# Patient Record
Sex: MALE | Race: WHITE | NOT HISPANIC OR LATINO
[De-identification: names, ages, dates, MRNs, and addresses within clinical notes are randomized per-mention and may not be internally consistent; named-entity substitution may affect disease eponyms.]

---

## 2017-01-04 ENCOUNTER — MEDICATION RENEWAL (OUTPATIENT)
Age: 47
End: 2017-01-04

## 2017-04-24 ENCOUNTER — APPOINTMENT (OUTPATIENT)
Dept: INTERNAL MEDICINE | Facility: CLINIC | Age: 47
End: 2017-04-24

## 2017-04-24 VITALS — OXYGEN SATURATION: 95 % | HEART RATE: 72 BPM | DIASTOLIC BLOOD PRESSURE: 80 MMHG | SYSTOLIC BLOOD PRESSURE: 125 MMHG

## 2017-05-10 ENCOUNTER — APPOINTMENT (OUTPATIENT)
Dept: INTERNAL MEDICINE | Facility: CLINIC | Age: 47
End: 2017-05-10

## 2017-05-10 VITALS — HEART RATE: 91 BPM | OXYGEN SATURATION: 97 % | DIASTOLIC BLOOD PRESSURE: 75 MMHG | SYSTOLIC BLOOD PRESSURE: 130 MMHG

## 2017-05-18 ENCOUNTER — APPOINTMENT (OUTPATIENT)
Dept: ORTHOPEDIC SURGERY | Facility: CLINIC | Age: 47
End: 2017-05-18

## 2017-05-18 VITALS — DIASTOLIC BLOOD PRESSURE: 88 MMHG | SYSTOLIC BLOOD PRESSURE: 120 MMHG | HEART RATE: 89 BPM

## 2017-05-18 VITALS — BODY MASS INDEX: 40.43 KG/M2 | WEIGHT: 315 LBS | HEIGHT: 74 IN

## 2017-05-21 ENCOUNTER — APPOINTMENT (OUTPATIENT)
Dept: MRI IMAGING | Facility: CLINIC | Age: 47
End: 2017-05-21

## 2017-05-21 ENCOUNTER — OUTPATIENT (OUTPATIENT)
Dept: OUTPATIENT SERVICES | Facility: HOSPITAL | Age: 47
LOS: 1 days | End: 2017-05-21
Payer: COMMERCIAL

## 2017-05-21 DIAGNOSIS — Z00.8 ENCOUNTER FOR OTHER GENERAL EXAMINATION: ICD-10-CM

## 2017-05-21 PROCEDURE — 73721 MRI JNT OF LWR EXTRE W/O DYE: CPT

## 2017-06-01 ENCOUNTER — APPOINTMENT (OUTPATIENT)
Dept: ORTHOPEDIC SURGERY | Facility: CLINIC | Age: 47
End: 2017-06-01

## 2017-06-14 ENCOUNTER — OUTPATIENT (OUTPATIENT)
Dept: OUTPATIENT SERVICES | Facility: HOSPITAL | Age: 47
LOS: 1 days | End: 2017-06-14
Payer: COMMERCIAL

## 2017-06-14 VITALS
HEART RATE: 80 BPM | SYSTOLIC BLOOD PRESSURE: 156 MMHG | DIASTOLIC BLOOD PRESSURE: 102 MMHG | RESPIRATION RATE: 16 BRPM | TEMPERATURE: 98 F | HEIGHT: 73.25 IN | WEIGHT: 315 LBS | OXYGEN SATURATION: 99 %

## 2017-06-14 DIAGNOSIS — R94.31 ABNORMAL ELECTROCARDIOGRAM [ECG] [EKG]: ICD-10-CM

## 2017-06-14 DIAGNOSIS — S83.209A UNSPECIFIED TEAR OF UNSPECIFIED MENISCUS, CURRENT INJURY, UNSPECIFIED KNEE, INITIAL ENCOUNTER: ICD-10-CM

## 2017-06-14 DIAGNOSIS — E66.9 OBESITY, UNSPECIFIED: ICD-10-CM

## 2017-06-14 DIAGNOSIS — I82.409 ACUTE EMBOLISM AND THROMBOSIS OF UNSPECIFIED DEEP VEINS OF UNSPECIFIED LOWER EXTREMITY: ICD-10-CM

## 2017-06-14 DIAGNOSIS — I10 ESSENTIAL (PRIMARY) HYPERTENSION: ICD-10-CM

## 2017-06-14 DIAGNOSIS — K08.499 PARTIAL LOSS OF TEETH DUE TO OTHER SPECIFIED CAUSE, UNSPECIFIED CLASS: Chronic | ICD-10-CM

## 2017-06-14 DIAGNOSIS — S83.242A OTHER TEAR OF MEDIAL MENISCUS, CURRENT INJURY, LEFT KNEE, INITIAL ENCOUNTER: ICD-10-CM

## 2017-06-14 LAB
BUN SERPL-MCNC: 12 MG/DL — SIGNIFICANT CHANGE UP (ref 7–23)
CALCIUM SERPL-MCNC: 9.5 MG/DL — SIGNIFICANT CHANGE UP (ref 8.4–10.5)
CHLORIDE SERPL-SCNC: 101 MMOL/L — SIGNIFICANT CHANGE UP (ref 98–107)
CO2 SERPL-SCNC: 21 MMOL/L — LOW (ref 22–31)
CREAT SERPL-MCNC: 1.05 MG/DL — SIGNIFICANT CHANGE UP (ref 0.5–1.3)
GLUCOSE SERPL-MCNC: 130 MG/DL — HIGH (ref 70–99)
HCT VFR BLD CALC: 49.9 % — SIGNIFICANT CHANGE UP (ref 39–50)
HGB BLD-MCNC: 17 G/DL — SIGNIFICANT CHANGE UP (ref 13–17)
MCHC RBC-ENTMCNC: 30.9 PG — SIGNIFICANT CHANGE UP (ref 27–34)
MCHC RBC-ENTMCNC: 34.1 % — SIGNIFICANT CHANGE UP (ref 32–36)
MCV RBC AUTO: 90.7 FL — SIGNIFICANT CHANGE UP (ref 80–100)
PLATELET # BLD AUTO: 208 K/UL — SIGNIFICANT CHANGE UP (ref 150–400)
PMV BLD: 11.2 FL — SIGNIFICANT CHANGE UP (ref 7–13)
POTASSIUM SERPL-MCNC: 4 MMOL/L — SIGNIFICANT CHANGE UP (ref 3.5–5.3)
POTASSIUM SERPL-SCNC: 4 MMOL/L — SIGNIFICANT CHANGE UP (ref 3.5–5.3)
RBC # BLD: 5.5 M/UL — SIGNIFICANT CHANGE UP (ref 4.2–5.8)
RBC # FLD: 12.6 % — SIGNIFICANT CHANGE UP (ref 10.3–14.5)
SODIUM SERPL-SCNC: 140 MMOL/L — SIGNIFICANT CHANGE UP (ref 135–145)
WBC # BLD: 10.53 K/UL — HIGH (ref 3.8–10.5)
WBC # FLD AUTO: 10.53 K/UL — HIGH (ref 3.8–10.5)

## 2017-06-14 PROCEDURE — 93010 ELECTROCARDIOGRAM REPORT: CPT

## 2017-06-14 NOTE — H&P PST ADULT - NEGATIVE GENERAL SYMPTOMS
no weight gain/no polyphagia/no polyuria/no chills/no fever/no polydipsia/no sweating/no weight loss

## 2017-06-14 NOTE — H&P PST ADULT - HISTORY OF PRESENT ILLNESS
45 y/o male with PMH of HTN , Bipolar Disorder, DVT of Left Leg in 2015 currently on Xarelto and Obesity presents to PST for preoperative evaluation with diagnosis of tear of medial meniscus, left knee. Pt reports during snow storm earlier this year, he twisted his knee when he slipped on ice. He states he continued to work on the knee hoping the pain would stop but it never subsided. Seen by an Orthopedic doctor and sent for MRI which noted tear to meniscus. Scheduled for Left Knee Partial Medical Menisectomy on 6/27/2017. c/o sharp pain of left knee when walking or climbing stairs. 47 y/o male with PMH of HTN , Bipolar Disorder, DVT/PE in 2015 currently on Xarelto and Obesity presents to PST for preoperative evaluation with diagnosis of tear of medial meniscus, left knee. Pt reports during snow storm earlier this year, he twisted his knee when he slipped on ice. He states he continued to work on the knee hoping the pain would stop but it never subsided. Seen by an Orthopedic doctor and sent for MRI which noted tear to meniscus. Scheduled for Left Knee Partial Medical Menisectomy on 6/27/2017. c/o sharp pain of left knee when walking or climbing stairs.

## 2017-06-14 NOTE — H&P PST ADULT - NEGATIVE CARDIOVASCULAR SYMPTOMS
no peripheral edema/no chest pain/no claudication/no palpitations/no paroxysmal nocturnal dyspnea/no dyspnea on exertion

## 2017-06-14 NOTE — H&P PST ADULT - RS GEN PE MLT RESP DETAILS PC
no chest wall tenderness/good air movement/breath sounds equal/no wheezes/respirations non-labored/airway patent

## 2017-06-14 NOTE — H&P PST ADULT - NEGATIVE ENMT SYMPTOMS
no ear pain/no tinnitus/no hearing difficulty/no dysphagia/no gum bleeding/no nose bleeds/no vertigo

## 2017-06-14 NOTE — H&P PST ADULT - NEGATIVE PSYCHIATRIC SYMPTOMS
no mood swings/no auditory hallucinations/no insomnia/no memory loss/no visual hallucinations/no paranoia/no anxiety

## 2017-06-14 NOTE — H&P PST ADULT - PROBLEM SELECTOR PLAN 3
Pt states he was told to hold Xarelto 5-7 days preop as per surgeon   Appt with PCP on 6/20/2017 for medical clearance  Medical clearance note requested

## 2017-06-14 NOTE — H&P PST ADULT - NEGATIVE NEUROLOGICAL SYMPTOMS
no paresthesias/no difficulty walking/no focal seizures/no facial palsy/no transient paralysis/no weakness/no loss of sensation/no confusion/no tremors/no hemiparesis/no syncope/no vertigo/no headache

## 2017-06-14 NOTE — H&P PST ADULT - PMH
Bipolar disorder    Current smoker    DVT (deep venous thrombosis)  left leg in 2015. currently on Xarelto  Hypertension    Meniscus tear  medial, left knee  Obesity  BMI 43  PE (pulmonary thromboembolism)  in 2015 currently on Xarelto

## 2017-06-14 NOTE — H&P PST ADULT - PSYCHIATRIC COMMENTS
h/o bipolar-controlled with meds. Seen by psych monthly h/o bipolar-controlled with meds. Seen by psych and receives Haldol injections monthly

## 2017-06-14 NOTE — H&P PST ADULT - NSANTHOSAYNRD_GEN_A_CORE
No. OFELIA screening performed.  STOP BANG Legend: 0-2 = LOW Risk; 3-4 = INTERMEDIATE Risk; 5-8 = HIGH Risk

## 2017-06-14 NOTE — H&P PST ADULT - PROBLEM SELECTOR PLAN 1
Scheduled for Left Knee Partial Medical Menisectomy on 6/27/2017.  Pre op instructions given, pt verbalized understanding   Chlorhexidine wash and GI prophylaxis provided

## 2017-06-19 ENCOUNTER — RESULT CHARGE (OUTPATIENT)
Age: 47
End: 2017-06-19

## 2017-06-20 ENCOUNTER — APPOINTMENT (OUTPATIENT)
Dept: INTERNAL MEDICINE | Facility: CLINIC | Age: 47
End: 2017-06-20

## 2017-06-20 ENCOUNTER — NON-APPOINTMENT (OUTPATIENT)
Age: 47
End: 2017-06-20

## 2017-06-20 VITALS
SYSTOLIC BLOOD PRESSURE: 130 MMHG | DIASTOLIC BLOOD PRESSURE: 84 MMHG | WEIGHT: 315 LBS | OXYGEN SATURATION: 97 % | BODY MASS INDEX: 41.09 KG/M2 | HEART RATE: 82 BPM

## 2017-06-20 DIAGNOSIS — Z86.39 PERSONAL HISTORY OF OTHER ENDOCRINE, NUTRITIONAL AND METABOLIC DISEASE: ICD-10-CM

## 2017-06-20 DIAGNOSIS — I44.4 LEFT ANTERIOR FASCICULAR BLOCK: ICD-10-CM

## 2017-06-20 DIAGNOSIS — Z72.0 TOBACCO USE: ICD-10-CM

## 2017-06-27 ENCOUNTER — TRANSCRIPTION ENCOUNTER (OUTPATIENT)
Age: 47
End: 2017-06-27

## 2017-06-27 ENCOUNTER — OUTPATIENT (OUTPATIENT)
Dept: OUTPATIENT SERVICES | Facility: HOSPITAL | Age: 47
LOS: 1 days | Discharge: HOME | End: 2017-06-27
Payer: COMMERCIAL

## 2017-06-27 ENCOUNTER — APPOINTMENT (OUTPATIENT)
Dept: ORTHOPEDIC SURGERY | Facility: AMBULATORY SURGERY CENTER | Age: 47
End: 2017-06-27

## 2017-06-27 VITALS
DIASTOLIC BLOOD PRESSURE: 91 MMHG | HEART RATE: 76 BPM | RESPIRATION RATE: 16 BRPM | SYSTOLIC BLOOD PRESSURE: 129 MMHG | OXYGEN SATURATION: 96 % | TEMPERATURE: 98 F

## 2017-06-27 VITALS
TEMPERATURE: 98 F | HEART RATE: 78 BPM | HEIGHT: 73.25 IN | WEIGHT: 315 LBS | RESPIRATION RATE: 24 BRPM | OXYGEN SATURATION: 97 % | SYSTOLIC BLOOD PRESSURE: 133 MMHG | DIASTOLIC BLOOD PRESSURE: 96 MMHG

## 2017-06-27 DIAGNOSIS — S83.242A OTHER TEAR OF MEDIAL MENISCUS, CURRENT INJURY, LEFT KNEE, INITIAL ENCOUNTER: ICD-10-CM

## 2017-06-27 DIAGNOSIS — K08.499 PARTIAL LOSS OF TEETH DUE TO OTHER SPECIFIED CAUSE, UNSPECIFIED CLASS: Chronic | ICD-10-CM

## 2017-06-27 PROCEDURE — 29875 ARTHRS KNEE SURG SYNVCT LMTD: CPT | Mod: LT

## 2017-06-27 PROCEDURE — 29881 ARTHRS KNE SRG MNISECTMY M/L: CPT | Mod: LT

## 2017-06-27 NOTE — ASU DISCHARGE PLAN (ADULT/PEDIATRIC). - NOTIFY
Pain not relieved by Medications/Unable to Urinate/Swelling that continues/Numbness, tingling/GYN Fever>100.4/Persistent Nausea and Vomiting/Numbness, color, or temperature change to extremity/Bleeding that does not stop

## 2017-06-27 NOTE — ASU DISCHARGE PLAN (ADULT/PEDIATRIC). - SPECIAL INSTRUCTIONS
See discharge instruction sheet. Weight bear as tolerated. Keep leg elevated. CONTINUE taking home Xarelto dose for DVT prophylaxis. Remove the dressing in 3 days, and you may start showering at that time, but keep the wounds covered with a dry gauze dressing and ACE wrap. Take pain medication as needed, as prescribed. Follow up with Dr. Emerson in 10-14 days.

## 2017-06-27 NOTE — ASU DISCHARGE PLAN (ADULT/PEDIATRIC). - MEDICATION SUMMARY - MEDICATIONS TO TAKE
I will START or STAY ON the medications listed below when I get home from the hospital:    see medication reconciliation sheet  --     -- Indication: For Other tear of medial meniscus, current injury, left knee, initial encounter

## 2017-06-27 NOTE — ASU DISCHARGE PLAN (ADULT/PEDIATRIC). - NURSING INSTRUCTIONS
DO NOT TAKE TYLENOL WITH THE PAIN MEDICINE AS THERE IS TYLENOL IN THE PAIN MEDICINE. Narcotic pain medicine is constipating , Buy over the counter stool softener and take as instructed on the bottle.

## 2017-07-10 ENCOUNTER — APPOINTMENT (OUTPATIENT)
Dept: ORTHOPEDIC SURGERY | Facility: CLINIC | Age: 47
End: 2017-07-10

## 2017-07-18 ENCOUNTER — OTHER (OUTPATIENT)
Age: 47
End: 2017-07-18

## 2017-07-19 ENCOUNTER — APPOINTMENT (OUTPATIENT)
Dept: CV DIAGNOSITCS | Facility: HOSPITAL | Age: 47
End: 2017-07-19

## 2017-07-19 ENCOUNTER — OUTPATIENT (OUTPATIENT)
Dept: OUTPATIENT SERVICES | Facility: HOSPITAL | Age: 47
LOS: 1 days | End: 2017-07-19
Payer: COMMERCIAL

## 2017-07-19 DIAGNOSIS — I77.810 THORACIC AORTIC ECTASIA: ICD-10-CM

## 2017-07-19 DIAGNOSIS — K08.499 PARTIAL LOSS OF TEETH DUE TO OTHER SPECIFIED CAUSE, UNSPECIFIED CLASS: Chronic | ICD-10-CM

## 2017-07-19 PROCEDURE — 93306 TTE W/DOPPLER COMPLETE: CPT | Mod: 26

## 2017-07-19 PROCEDURE — C8929: CPT

## 2017-07-24 ENCOUNTER — NON-APPOINTMENT (OUTPATIENT)
Age: 47
End: 2017-07-24

## 2017-07-24 ENCOUNTER — APPOINTMENT (OUTPATIENT)
Dept: CARDIOLOGY | Facility: CLINIC | Age: 47
End: 2017-07-24

## 2017-07-24 VITALS
SYSTOLIC BLOOD PRESSURE: 123 MMHG | HEIGHT: 74 IN | RESPIRATION RATE: 16 BRPM | WEIGHT: 315 LBS | OXYGEN SATURATION: 96 % | BODY MASS INDEX: 40.43 KG/M2 | DIASTOLIC BLOOD PRESSURE: 88 MMHG | HEART RATE: 82 BPM

## 2017-08-07 ENCOUNTER — APPOINTMENT (OUTPATIENT)
Dept: ORTHOPEDIC SURGERY | Facility: CLINIC | Age: 47
End: 2017-08-07
Payer: COMMERCIAL

## 2017-08-07 PROCEDURE — 99024 POSTOP FOLLOW-UP VISIT: CPT

## 2017-09-11 ENCOUNTER — APPOINTMENT (OUTPATIENT)
Dept: INTERNAL MEDICINE | Facility: CLINIC | Age: 47
End: 2017-09-11
Payer: COMMERCIAL

## 2017-09-11 VITALS — SYSTOLIC BLOOD PRESSURE: 120 MMHG | DIASTOLIC BLOOD PRESSURE: 90 MMHG | HEART RATE: 75 BPM | OXYGEN SATURATION: 98 %

## 2017-09-11 DIAGNOSIS — Z01.818 ENCOUNTER FOR OTHER PREPROCEDURAL EXAMINATION: ICD-10-CM

## 2017-09-11 PROCEDURE — 99214 OFFICE O/P EST MOD 30 MIN: CPT

## 2017-09-11 RX ORDER — OXYCODONE AND ACETAMINOPHEN 5; 325 MG/1; MG/1
5-325 TABLET ORAL
Qty: 50 | Refills: 0 | Status: DISCONTINUED | COMMUNITY
Start: 2017-06-26 | End: 2017-09-11

## 2018-01-29 ENCOUNTER — APPOINTMENT (OUTPATIENT)
Dept: CARDIOLOGY | Facility: CLINIC | Age: 48
End: 2018-01-29

## 2018-03-15 ENCOUNTER — RX RENEWAL (OUTPATIENT)
Age: 48
End: 2018-03-15

## 2018-04-23 ENCOUNTER — RX RENEWAL (OUTPATIENT)
Age: 48
End: 2018-04-23

## 2018-07-22 PROBLEM — I44.4 LEFT ANTERIOR FASCICULAR BLOCK: Status: ACTIVE | Noted: 2017-06-20

## 2018-09-19 PROBLEM — S83.209A UNSPECIFIED TEAR OF UNSPECIFIED MENISCUS, CURRENT INJURY, UNSPECIFIED KNEE, INITIAL ENCOUNTER: Chronic | Status: ACTIVE | Noted: 2017-06-14

## 2018-09-19 PROBLEM — I26.99 OTHER PULMONARY EMBOLISM WITHOUT ACUTE COR PULMONALE: Chronic | Status: ACTIVE | Noted: 2017-06-14

## 2018-09-19 PROBLEM — F17.200 NICOTINE DEPENDENCE, UNSPECIFIED, UNCOMPLICATED: Chronic | Status: ACTIVE | Noted: 2017-06-14

## 2018-10-22 ENCOUNTER — APPOINTMENT (OUTPATIENT)
Dept: CARDIOLOGY | Facility: CLINIC | Age: 48
End: 2018-10-22

## 2018-10-23 ENCOUNTER — APPOINTMENT (OUTPATIENT)
Dept: INTERNAL MEDICINE | Facility: CLINIC | Age: 48
End: 2018-10-23
Payer: COMMERCIAL

## 2018-10-23 ENCOUNTER — NON-APPOINTMENT (OUTPATIENT)
Age: 48
End: 2018-10-23

## 2018-10-23 VITALS
WEIGHT: 315 LBS | BODY MASS INDEX: 43.14 KG/M2 | SYSTOLIC BLOOD PRESSURE: 140 MMHG | OXYGEN SATURATION: 98 % | HEART RATE: 101 BPM | DIASTOLIC BLOOD PRESSURE: 82 MMHG

## 2018-10-23 VITALS — HEART RATE: 108 BPM

## 2018-10-23 DIAGNOSIS — M25.562 PAIN IN LEFT KNEE: ICD-10-CM

## 2018-10-23 DIAGNOSIS — S83.242D OTHER TEAR OF MEDIAL MENISCUS, CURRENT INJURY, LEFT KNEE, SUBSEQUENT ENCOUNTER: ICD-10-CM

## 2018-10-23 DIAGNOSIS — R00.0 TACHYCARDIA, UNSPECIFIED: ICD-10-CM

## 2018-10-23 PROCEDURE — 90715 TDAP VACCINE 7 YRS/> IM: CPT

## 2018-10-23 PROCEDURE — 90686 IIV4 VACC NO PRSV 0.5 ML IM: CPT

## 2018-10-23 PROCEDURE — 90472 IMMUNIZATION ADMIN EACH ADD: CPT

## 2018-10-23 PROCEDURE — 99396 PREV VISIT EST AGE 40-64: CPT | Mod: 25

## 2018-10-23 PROCEDURE — G0008: CPT

## 2018-10-23 PROCEDURE — 93000 ELECTROCARDIOGRAM COMPLETE: CPT

## 2018-11-07 ENCOUNTER — APPOINTMENT (OUTPATIENT)
Dept: CV DIAGNOSITCS | Facility: HOSPITAL | Age: 48
End: 2018-11-07

## 2018-11-07 ENCOUNTER — OUTPATIENT (OUTPATIENT)
Dept: OUTPATIENT SERVICES | Facility: HOSPITAL | Age: 48
LOS: 1 days | End: 2018-11-07
Payer: COMMERCIAL

## 2018-11-07 DIAGNOSIS — K08.499 PARTIAL LOSS OF TEETH DUE TO OTHER SPECIFIED CAUSE, UNSPECIFIED CLASS: Chronic | ICD-10-CM

## 2018-11-07 DIAGNOSIS — I25.10 ATHEROSCLEROTIC HEART DISEASE OF NATIVE CORONARY ARTERY WITHOUT ANGINA PECTORIS: ICD-10-CM

## 2018-11-07 PROCEDURE — C8929: CPT

## 2018-11-07 PROCEDURE — 93306 TTE W/DOPPLER COMPLETE: CPT | Mod: 26

## 2018-11-26 ENCOUNTER — APPOINTMENT (OUTPATIENT)
Dept: CARDIOLOGY | Facility: CLINIC | Age: 48
End: 2018-11-26
Payer: COMMERCIAL

## 2018-11-26 ENCOUNTER — NON-APPOINTMENT (OUTPATIENT)
Age: 48
End: 2018-11-26

## 2018-11-26 VITALS
SYSTOLIC BLOOD PRESSURE: 123 MMHG | DIASTOLIC BLOOD PRESSURE: 88 MMHG | BODY MASS INDEX: 40.43 KG/M2 | HEART RATE: 78 BPM | OXYGEN SATURATION: 97 % | WEIGHT: 315 LBS | HEIGHT: 74 IN

## 2018-11-26 PROCEDURE — 93000 ELECTROCARDIOGRAM COMPLETE: CPT

## 2018-11-26 PROCEDURE — 99214 OFFICE O/P EST MOD 30 MIN: CPT

## 2018-11-26 NOTE — HISTORY OF PRESENT ILLNESS
[FreeTextEntry1] : Torey Velasquez is a 48 year old morbidly obese man with poor hygiene and  a history of hypertension, bipolar disorder, dilated aortic root and DVT/PE 7 years ago following immobilization in hospital, who  redeveloped  DVT/PE in December of 2014 and is being maintained on Xarelto.\par  He had developed pain in the leg, and an ultrasound showed a DVT in right popliteal vein on 12/18/14.  CTPA on 12/20/14 showed bilateral PE without evidence of right heart strain.  He denied any immobilization or trauma preceding the new event. \par \par Of note, his father also had a history of DVT following surgeries and was found to have a lupus anticoagulant and heterozygosity for  MTHFR gene, with low levels of protein C and S, as well.\par \par Mr. Velasquez was recently seen by the hematologist who has determined that based on his recurrent history of DVT/PE and his family history that he should remain on anticoagulation life long.\par \par Most recently, patient's PCP has recommended a thyroid ultrasound for a palpable nodule and a sleep study to rule out sleep apnea. He presents today for a routine follow up cardiac evaluation.

## 2018-11-26 NOTE — REASON FOR VISIT
[Follow-Up - Clinic] : a clinic follow-up of [Hypertension] : hypertension [FreeTextEntry1] : s/p BL PEs and moderate aortic root dilatation

## 2018-11-26 NOTE — DISCUSSION/SUMMARY
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

## 2018-11-26 NOTE — PHYSICAL EXAM
[General Appearance - Well Developed] : well developed [Normal Appearance] : normal appearance [Well Groomed] : well groomed [General Appearance - Well Nourished] : well nourished [No Deformities] : no deformities [General Appearance - In No Acute Distress] : no acute distress [Normal Conjunctiva] : the conjunctiva exhibited no abnormalities [Eyelids - No Xanthelasma] : the eyelids demonstrated no xanthelasmas [Normal Oral Mucosa] : normal oral mucosa [No Oral Pallor] : no oral pallor [No Oral Cyanosis] : no oral cyanosis [Normal Jugular Venous A Waves Present] : normal jugular venous A waves present [Normal Jugular Venous V Waves Present] : normal jugular venous V waves present [No Jugular Venous Hamm A Waves] : no jugular venous hamm A waves [Respiration, Rhythm And Depth] : normal respiratory rhythm and effort [Exaggerated Use Of Accessory Muscles For Inspiration] : no accessory muscle use [Auscultation Breath Sounds / Voice Sounds] : lungs were clear to auscultation bilaterally [Heart Rate And Rhythm] : heart rate and rhythm were normal [Heart Sounds] : normal S1 and S2 [Murmurs] : no murmurs present [Abdomen Soft] : soft [Abdomen Tenderness] : non-tender [Abdomen Mass (___ Cm)] : no abdominal mass palpated [Abnormal Walk] : normal gait [Gait - Sufficient For Exercise Testing] : the gait was sufficient for exercise testing [Nail Clubbing] : no clubbing of the fingernails [Cyanosis, Localized] : no localized cyanosis [Petechial Hemorrhages (___cm)] : no petechial hemorrhages [Skin Color & Pigmentation] : normal skin color and pigmentation [] : no rash [No Venous Stasis] : no venous stasis [Skin Lesions] : no skin lesions [No Skin Ulcers] : no skin ulcer [No Xanthoma] : no  xanthoma was observed [Oriented To Time, Place, And Person] : oriented to person, place, and time [Normal] : normal [Rhythm Regular] : regular [No Murmur] : no murmurs heard [2+] : left 2+ [No Pitting Edema] : no pitting edema present [FreeTextEntry1] : bipolar disorder [Rt] : no varicose veins of the right leg [Lt] : no varicose veins of the left leg

## 2018-12-07 LAB
25(OH)D3 SERPL-MCNC: 28.4 NG/ML
ALBUMIN SERPL ELPH-MCNC: 4.7 G/DL
ALP BLD-CCNC: 75 U/L
ALT SERPL-CCNC: 33 U/L
ANION GAP SERPL CALC-SCNC: 15 MMOL/L
AST SERPL-CCNC: 25 U/L
BASOPHILS # BLD AUTO: 0.04 K/UL
BASOPHILS NFR BLD AUTO: 0.3 %
BILIRUB SERPL-MCNC: 0.3 MG/DL
BUN SERPL-MCNC: 11 MG/DL
CALCIUM SERPL-MCNC: 9.6 MG/DL
CHLORIDE SERPL-SCNC: 105 MMOL/L
CHOLEST SERPL-MCNC: 181 MG/DL
CHOLEST/HDLC SERPL: 6 RATIO
CO2 SERPL-SCNC: 21 MMOL/L
CREAT SERPL-MCNC: 1.03 MG/DL
EOSINOPHIL # BLD AUTO: 0.19 K/UL
EOSINOPHIL NFR BLD AUTO: 1.6 %
GLUCOSE SERPL-MCNC: 100 MG/DL
HBA1C MFR BLD HPLC: 5.4 %
HCT VFR BLD CALC: 48.4 %
HDLC SERPL-MCNC: 30 MG/DL
HGB BLD-MCNC: 17.2 G/DL
IMM GRANULOCYTES NFR BLD AUTO: 0.8 %
LDLC SERPL CALC-MCNC: 83 MG/DL
LYMPHOCYTES # BLD AUTO: 3.23 K/UL
LYMPHOCYTES NFR BLD AUTO: 27.3 %
MAN DIFF?: NORMAL
MCHC RBC-ENTMCNC: 30.4 PG
MCHC RBC-ENTMCNC: 35.5 GM/DL
MCV RBC AUTO: 85.5 FL
MONOCYTES # BLD AUTO: 0.95 K/UL
MONOCYTES NFR BLD AUTO: 8 %
NEUTROPHILS # BLD AUTO: 7.32 K/UL
NEUTROPHILS NFR BLD AUTO: 62 %
PLATELET # BLD AUTO: 240 K/UL
POTASSIUM SERPL-SCNC: 3.7 MMOL/L
PROT SERPL-MCNC: 7.5 G/DL
RBC # BLD: 5.66 M/UL
RBC # FLD: 12.9 %
SODIUM SERPL-SCNC: 141 MMOL/L
T4 FREE SERPL-MCNC: 1.3 NG/DL
TRIGL SERPL-MCNC: 342 MG/DL
TSH SERPL-ACNC: 7.06 UIU/ML
WBC # FLD AUTO: 11.82 K/UL

## 2019-01-04 LAB
BASOPHILS # BLD AUTO: 0.04 K/UL
BASOPHILS NFR BLD AUTO: 0.4 %
EOSINOPHIL # BLD AUTO: 0.19 K/UL
EOSINOPHIL NFR BLD AUTO: 1.8 %
HCT VFR BLD CALC: 47.9 %
HGB BLD-MCNC: 16.2 G/DL
IMM GRANULOCYTES NFR BLD AUTO: 0.6 %
LYMPHOCYTES # BLD AUTO: 3.4 K/UL
LYMPHOCYTES NFR BLD AUTO: 31.6 %
MAN DIFF?: NORMAL
MCHC RBC-ENTMCNC: 30.6 PG
MCHC RBC-ENTMCNC: 33.8 GM/DL
MCV RBC AUTO: 90.5 FL
MONOCYTES # BLD AUTO: 0.65 K/UL
MONOCYTES NFR BLD AUTO: 6 %
NEUTROPHILS # BLD AUTO: 6.43 K/UL
NEUTROPHILS NFR BLD AUTO: 59.6 %
PLATELET # BLD AUTO: 220 K/UL
RBC # BLD: 5.29 M/UL
RBC # FLD: 13.3 %
TSH SERPL-ACNC: 4.43 UIU/ML
WBC # FLD AUTO: 10.77 K/UL

## 2019-01-04 NOTE — PHYSICAL EXAM
[No Acute Distress] : no acute distress [Well Nourished] : well nourished [Well Developed] : well developed [Well-Appearing] : well-appearing [PERRL] : pupils equal round and reactive to light [Normal Oropharynx] : the oropharynx was normal [Normal Nasal Mucosa] : the nasal mucosa was normal [Supple] : supple [No Lymphadenopathy] : no lymphadenopathy [Thyroid Normal, No Nodules] : the thyroid was normal and there were no nodules present [No Respiratory Distress] : no respiratory distress  [Clear to Auscultation] : lungs were clear to auscultation bilaterally [No Accessory Muscle Use] : no accessory muscle use [Normal Rate] : normal rate  [Regular Rhythm] : with a regular rhythm [Normal S1, S2] : normal S1 and S2 [No Murmur] : no murmur heard [No Carotid Bruits] : no carotid bruits [Pedal Pulses Present] : the pedal pulses are present [No Edema] : there was no peripheral edema [Normal Appearance] : normal in appearance [Soft] : abdomen soft [Non Tender] : non-tender [Non-distended] : non-distended [No Masses] : no abdominal mass palpated [No HSM] : no HSM [Normal Bowel Sounds] : normal bowel sounds [Normal Supraclavicular Nodes] : no supraclavicular lymphadenopathy [Normal Posterior Cervical Nodes] : no posterior cervical lymphadenopathy [Normal Anterior Cervical Nodes] : no anterior cervical lymphadenopathy [No Joint Swelling] : no joint swelling [No Rash] : no rash [Normal Gait] : normal gait [Normal Affect] : the affect was normal [de-identified] : cerumen right ear canal, nl left ear canal [de-identified] : declined  exam

## 2019-01-04 NOTE — ADDENDUM
[FreeTextEntry1] : 12/7/18:  Reviewed labs with pt, nl except WBC 11.8 elevated with Hgb 17.2 elevated (will repeat), TSH 7.0 (will repeat) with nl free T4, vitamin D 28 c/w insufficiency (to take vitamin D 2000 Iu/day), LDL 83 with triglycerides 340 improved, hgbA1c 5.4 improved (nl), other labs nl.\par 1/4/19:  Reviewed TSH and CBC with pt, WBC 10.77 elevated but improved (?from obesity, continue to monitor), TSH 4.43 still elevated, still subclinical hypothyroidism, given obesity will opt to treat in case helps with weight loss, will start with levothyroxine 25 mcg day and recheck in 2 months.

## 2019-01-04 NOTE — REVIEW OF SYSTEMS
[Nocturia] : nocturia [Negative] : Musculoskeletal [Fever] : no fever [Chills] : no chills [Fatigue] : no fatigue [Recent Change In Weight] : ~T no recent weight change [Chest Pain] : no chest pain [Palpitations] : no palpitations [Shortness Of Breath] : no shortness of breath [Cough] : no cough [Abdominal Pain] : no abdominal pain [Nausea] : no nausea [Constipation] : no constipation [Diarrhea] : no diarrhea [Vomiting] : no vomiting [Heartburn] : no heartburn [Melena] : no melena [Dysuria] : no dysuria [Hesitancy] : no hesitancy [Skin Rash] : no skin rash [Dizziness] : no dizziness [Fainting] : no fainting [Anxiety] : no anxiety [Depression] : no depression [Easy Bleeding] : no easy bleeding [Easy Bruising] : no easy bruising [Swollen Glands] : no swollen glands [FreeTextEntry2] : lost 30 lbs but then gained it back; would like to lose 20 lbs over 3 months [FreeTextEntry8] : nocturia 2x/night for past 3 years, no incomplete voiding [de-identified] : see hpi, no hallucinations

## 2019-01-04 NOTE — HISTORY OF PRESENT ILLNESS
[FreeTextEntry1] : physical [de-identified] : 49 yo man with h/o as below here for CPE.\par Feeling well, no complaints.

## 2019-01-04 NOTE — ASSESSMENT
[FreeTextEntry1] : 49 yo man with h/o as above including morbid obesity, HTN, DVT/PE on chronic AC, bipolar disorder, aortic root dilatation, here for CPE.\par 1.  CV - bp borderline but will monitor, encouraged weight loss and to recheck bp in a few months; check lipids; ECG today for tachycardia NSR with LAFB, no gross abnl from prior, advised to f/up with cardiology and will send for 2D echo to f/up aortic root dilatation, check TFTs\par 2.  Endo - check hgbA1c and vitamin D for obesity\par 3.  GI - to consider colonoscopy\par 4.  HCM - check below labs; flu and tdap given today\par 5.  RTO 3-6 months bp check

## 2019-03-12 ENCOUNTER — RX RENEWAL (OUTPATIENT)
Age: 49
End: 2019-03-12

## 2019-03-15 ENCOUNTER — APPOINTMENT (OUTPATIENT)
Dept: INTERNAL MEDICINE | Facility: CLINIC | Age: 49
End: 2019-03-15
Payer: COMMERCIAL

## 2019-03-15 VITALS
OXYGEN SATURATION: 97 % | SYSTOLIC BLOOD PRESSURE: 100 MMHG | DIASTOLIC BLOOD PRESSURE: 60 MMHG | HEART RATE: 70 BPM | BODY MASS INDEX: 40.43 KG/M2 | WEIGHT: 315 LBS | HEIGHT: 74 IN

## 2019-03-15 VITALS — DIASTOLIC BLOOD PRESSURE: 90 MMHG | SYSTOLIC BLOOD PRESSURE: 130 MMHG

## 2019-03-15 LAB
BASOPHILS # BLD AUTO: 0.09 K/UL
BASOPHILS NFR BLD AUTO: 1.1 %
EOSINOPHIL # BLD AUTO: 0.25 K/UL
EOSINOPHIL NFR BLD AUTO: 3.1 %
HCT VFR BLD CALC: 46.8 %
HGB BLD-MCNC: 15.4 G/DL
IMM GRANULOCYTES NFR BLD AUTO: 0.6 %
LYMPHOCYTES # BLD AUTO: 2.68 K/UL
LYMPHOCYTES NFR BLD AUTO: 33.5 %
MAN DIFF?: NORMAL
MCHC RBC-ENTMCNC: 30.1 PG
MCHC RBC-ENTMCNC: 32.9 GM/DL
MCV RBC AUTO: 91.4 FL
MONOCYTES # BLD AUTO: 0.63 K/UL
MONOCYTES NFR BLD AUTO: 7.9 %
NEUTROPHILS # BLD AUTO: 4.29 K/UL
NEUTROPHILS NFR BLD AUTO: 53.8 %
PLATELET # BLD AUTO: 247 K/UL
RBC # BLD: 5.12 M/UL
RBC # FLD: 12.9 %
TSH SERPL-ACNC: 3.02 UIU/ML
WBC # FLD AUTO: 7.99 K/UL

## 2019-03-15 PROCEDURE — 99214 OFFICE O/P EST MOD 30 MIN: CPT

## 2019-03-15 NOTE — PHYSICAL EXAM
[No Acute Distress] : no acute distress [Well Nourished] : well nourished [Well Developed] : well developed [Well-Appearing] : well-appearing [Supple] : supple [Thyroid Normal, No Nodules] : the thyroid was normal and there were no nodules present [No Respiratory Distress] : no respiratory distress  [Clear to Auscultation] : lungs were clear to auscultation bilaterally [No Accessory Muscle Use] : no accessory muscle use [Normal Rate] : normal rate  [Regular Rhythm] : with a regular rhythm [Normal S1, S2] : normal S1 and S2 [No Murmur] : no murmur heard [de-identified] : trace edema LE b/l

## 2019-03-15 NOTE — HISTORY OF PRESENT ILLNESS
[FreeTextEntry1] : f/up thyroid [de-identified] : 49 yo man with h/o as below including HTN and hypothyroidism here for f/up visit and TSH check.  Feeling well overall, no complaints.  No hair or skin changes, no diarrhea or constipation, no fatigue.  Will need refill on synthroid.  \par Otherwise feeling well.  No dizziness or lightheadedness.  Didn't take medications yet today.\par No blood in stool or melena, no hematuria.\par No other active issues.  Not exercising. Would like referral to nutritionist/weight management.

## 2019-03-15 NOTE — ASSESSMENT
[FreeTextEntry1] : 49 yo man with h/o as above including HTN, obesity, bipolar disorder, DVT/PE on chronic AC, subclinical hypothyroidism, here for f/up visit and TSH check.\par 1.  Endo - check TSH and refill synthroid pending results, referred to weight management and nutritionist again\par 2.  CV - elevated bp but didn't take meds today, has been fairly controlled at prior visits, will monitor\par 3.  RTO 10/19 for cpe

## 2019-04-22 ENCOUNTER — RX RENEWAL (OUTPATIENT)
Age: 49
End: 2019-04-22

## 2020-01-17 ENCOUNTER — APPOINTMENT (OUTPATIENT)
Dept: INTERNAL MEDICINE | Facility: CLINIC | Age: 50
End: 2020-01-17
Payer: COMMERCIAL

## 2020-01-17 VITALS — DIASTOLIC BLOOD PRESSURE: 78 MMHG | SYSTOLIC BLOOD PRESSURE: 122 MMHG

## 2020-01-17 VITALS
SYSTOLIC BLOOD PRESSURE: 100 MMHG | DIASTOLIC BLOOD PRESSURE: 60 MMHG | BODY MASS INDEX: 40.43 KG/M2 | OXYGEN SATURATION: 98 % | WEIGHT: 315 LBS | HEART RATE: 70 BPM | HEIGHT: 74 IN

## 2020-01-17 PROCEDURE — 99396 PREV VISIT EST AGE 40-64: CPT

## 2020-01-29 ENCOUNTER — FORM ENCOUNTER (OUTPATIENT)
Age: 50
End: 2020-01-29

## 2020-01-29 ENCOUNTER — APPOINTMENT (OUTPATIENT)
Dept: ORTHOPEDIC SURGERY | Facility: CLINIC | Age: 50
End: 2020-01-29
Payer: COMMERCIAL

## 2020-01-29 PROCEDURE — 73030 X-RAY EXAM OF SHOULDER: CPT | Mod: LT

## 2020-01-29 PROCEDURE — 99214 OFFICE O/P EST MOD 30 MIN: CPT

## 2020-01-30 ENCOUNTER — OUTPATIENT (OUTPATIENT)
Dept: OUTPATIENT SERVICES | Facility: HOSPITAL | Age: 50
LOS: 1 days | End: 2020-01-30
Payer: COMMERCIAL

## 2020-01-30 ENCOUNTER — APPOINTMENT (OUTPATIENT)
Dept: ULTRASOUND IMAGING | Facility: CLINIC | Age: 50
End: 2020-01-30
Payer: COMMERCIAL

## 2020-01-30 DIAGNOSIS — Z00.8 ENCOUNTER FOR OTHER GENERAL EXAMINATION: ICD-10-CM

## 2020-01-30 DIAGNOSIS — K08.499 PARTIAL LOSS OF TEETH DUE TO OTHER SPECIFIED CAUSE, UNSPECIFIED CLASS: Chronic | ICD-10-CM

## 2020-01-30 PROCEDURE — 76536 US EXAM OF HEAD AND NECK: CPT | Mod: 26

## 2020-01-30 PROCEDURE — 76536 US EXAM OF HEAD AND NECK: CPT

## 2020-01-31 NOTE — ADDENDUM
[FreeTextEntry1] : This note was written by Martha Baig on 01/29/2020 acting solely as a scribe for Dr. Lio Emerson.\par \par All medical record entries made by the Scribe were at my, Dr. Lio Emerson, direction and personally dictated by me on 01/29/2020. I have personally reviewed the chart and agree that the record accurately reflects my personal performance of the history, physical exam, assessment and plan.

## 2020-01-31 NOTE — HISTORY OF PRESENT ILLNESS
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 He fell on to his bend and landed on his shoulder. He was not able to go to work Monday due to pain. He was evaluated at urgent care and x-rays were negative for fx. The pain brought on with over the head movements and internal rotation. Localizes the pain to the posterior shoulder. Notes his shoulder feels strained. He is not taking pain medication. Denies numbness or tingling.

## 2020-01-31 NOTE — DISCUSSION/SUMMARY
[de-identified] : 49 year old male presents with left shoulder injury\par \par Presentation of limited and painful ROM consistent with possible rotator cuff dysfunction. More imaging needed to confirm. MRI Rx given to patient to further delineate any internal structural derangement to the shoulder. This will allow for better understanding of the severity of disease, and allow for better stratification of treatment strategies (surgical vs. non-surgical). Patient is agreeable to further imaging.\par  \par Discussed short-term and long-term outcomes as well as the goal of treatment to reduce pain and restore function. Nonsurgical treatment is typically first-line therapy that may take weeks to months to resolve symptoms; includes rest from overhead activities, NSAIDs, home exercise program versus physical therapy to restore normal strength/ROM/function of the shoulder, and possible corticosteroid injection. Also discussed the role of arthroscopic surgical intervention when nonsurgical treatment does not adequately relieve pain/inflammation.\par  \par Recommendations: Trial of conservative modalities as above (rest from overhead activity and activity avoidance, ice, NSAIDs if tolerated.\par   \par Followup: After MRI

## 2020-01-31 NOTE — PHYSICAL EXAM
[de-identified] : General: well-appearing, not in acute distress and not obese. \par Gait: normal. \par Oriented to time, place, person\par Mood: Normal\par Affect: Normal\par \par Left shoulder exam\par \par Inspection: No malalignment, No defects, No atrophy\par Skin: No masses, No lesions\par Neck: Negative Spurling's, full ROM, no pain with ROM\par AROM: FF to 180, abduction to 80, ER to 30, IR to hip\par Q1uishp arc ROM: ER, IR\par Tenderness: no bicipital tenderness, no tenderness to the greater tuberosity/RTC insertion, no anterior shoulder/lesser tuberosity tenderness\par Strength: 5/5 ER, 5/5 IR in adduction, 5/5 supraspinatus testing, negative Elmont's test\par AC Joint: No ttp/pain with cross arm testing\par Biceps: Speed Negative, Yergusons Negative\par Impingement test: + Pemberton, + Neer \par Stability: Stable\par Vasc: 2+ radial pulse\par Neuro: AIN, PIN, Ulnar nerve intact to motor\par Sensation: Intact to light touch throughout [de-identified] : The following radiographs were ordered and read by me during this patients visit. I reviewed each radiograph in detail with the patient and discussed the findings as highlighted below. \par \par 3 views of the left shoulder were obtained today 01/29/2020  that show no acute fracture or dislocation. There is no glenohumeral and no AC joint degenerative change seen. Type II acromion. There is no significant malalignment. No significant other obvious osseous abnormality, otherwise unremarkable\par

## 2020-02-03 ENCOUNTER — APPOINTMENT (OUTPATIENT)
Dept: CV DIAGNOSITCS | Facility: HOSPITAL | Age: 50
End: 2020-02-03

## 2020-02-03 ENCOUNTER — OUTPATIENT (OUTPATIENT)
Dept: OUTPATIENT SERVICES | Facility: HOSPITAL | Age: 50
LOS: 1 days | End: 2020-02-03
Payer: COMMERCIAL

## 2020-02-03 DIAGNOSIS — K08.499 PARTIAL LOSS OF TEETH DUE TO OTHER SPECIFIED CAUSE, UNSPECIFIED CLASS: Chronic | ICD-10-CM

## 2020-02-03 DIAGNOSIS — I25.10 ATHEROSCLEROTIC HEART DISEASE OF NATIVE CORONARY ARTERY WITHOUT ANGINA PECTORIS: ICD-10-CM

## 2020-02-03 LAB
25(OH)D3 SERPL-MCNC: 31.8 NG/ML
ALBUMIN SERPL ELPH-MCNC: 4.8 G/DL
ALP BLD-CCNC: 85 U/L
ALT SERPL-CCNC: 33 U/L
ANION GAP SERPL CALC-SCNC: 12 MMOL/L
AST SERPL-CCNC: 16 U/L
BASOPHILS # BLD AUTO: 0.09 K/UL
BASOPHILS NFR BLD AUTO: 1 %
BILIRUB SERPL-MCNC: 0.4 MG/DL
BUN SERPL-MCNC: 13 MG/DL
CALCIUM SERPL-MCNC: 9.5 MG/DL
CHLORIDE SERPL-SCNC: 109 MMOL/L
CHOLEST SERPL-MCNC: 179 MG/DL
CHOLEST/HDLC SERPL: 5.2 RATIO
CO2 SERPL-SCNC: 19 MMOL/L
CREAT SERPL-MCNC: 0.95 MG/DL
EOSINOPHIL # BLD AUTO: 0.18 K/UL
EOSINOPHIL NFR BLD AUTO: 1.9 %
ESTIMATED AVERAGE GLUCOSE: 108 MG/DL
GLUCOSE SERPL-MCNC: 103 MG/DL
HBA1C MFR BLD HPLC: 5.4 %
HCT VFR BLD CALC: 51.5 %
HDLC SERPL-MCNC: 34 MG/DL
HGB BLD-MCNC: 17.2 G/DL
IMM GRANULOCYTES NFR BLD AUTO: 1.2 %
LDLC SERPL CALC-MCNC: 104 MG/DL
LYMPHOCYTES # BLD AUTO: 2.88 K/UL
LYMPHOCYTES NFR BLD AUTO: 30.5 %
MAN DIFF?: NORMAL
MCHC RBC-ENTMCNC: 30.2 PG
MCHC RBC-ENTMCNC: 33.4 GM/DL
MCV RBC AUTO: 90.4 FL
MONOCYTES # BLD AUTO: 0.68 K/UL
MONOCYTES NFR BLD AUTO: 7.2 %
NEUTROPHILS # BLD AUTO: 5.5 K/UL
NEUTROPHILS NFR BLD AUTO: 58.2 %
PLATELET # BLD AUTO: 258 K/UL
POTASSIUM SERPL-SCNC: 4.1 MMOL/L
PROT SERPL-MCNC: 7 G/DL
RBC # BLD: 5.7 M/UL
RBC # FLD: 12.7 %
SODIUM SERPL-SCNC: 140 MMOL/L
T4 FREE SERPL-MCNC: 1.1 NG/DL
TRIGL SERPL-MCNC: 203 MG/DL
TSH SERPL-ACNC: 6.04 UIU/ML
WBC # FLD AUTO: 9.44 K/UL

## 2020-02-03 PROCEDURE — C8929: CPT

## 2020-02-03 PROCEDURE — 93306 TTE W/DOPPLER COMPLETE: CPT | Mod: 26

## 2020-02-03 RX ORDER — LEVOTHYROXINE SODIUM 0.03 MG/1
25 TABLET ORAL
Qty: 90 | Refills: 3 | Status: DISCONTINUED | COMMUNITY
Start: 2019-01-04 | End: 2020-02-03

## 2020-02-04 ENCOUNTER — APPOINTMENT (OUTPATIENT)
Dept: MRI IMAGING | Facility: IMAGING CENTER | Age: 50
End: 2020-02-04
Payer: COMMERCIAL

## 2020-02-04 ENCOUNTER — OUTPATIENT (OUTPATIENT)
Dept: OUTPATIENT SERVICES | Facility: HOSPITAL | Age: 50
LOS: 1 days | End: 2020-02-04
Payer: COMMERCIAL

## 2020-02-04 DIAGNOSIS — Z00.00 ENCOUNTER FOR GENERAL ADULT MEDICAL EXAMINATION WITHOUT ABNORMAL FINDINGS: ICD-10-CM

## 2020-02-04 DIAGNOSIS — K08.499 PARTIAL LOSS OF TEETH DUE TO OTHER SPECIFIED CAUSE, UNSPECIFIED CLASS: Chronic | ICD-10-CM

## 2020-02-04 PROCEDURE — 73221 MRI JOINT UPR EXTREM W/O DYE: CPT

## 2020-02-04 PROCEDURE — 73221 MRI JOINT UPR EXTREM W/O DYE: CPT | Mod: 26,LT

## 2020-02-06 ENCOUNTER — APPOINTMENT (OUTPATIENT)
Dept: ORTHOPEDIC SURGERY | Facility: CLINIC | Age: 50
End: 2020-02-06
Payer: COMMERCIAL

## 2020-02-06 PROCEDURE — 99214 OFFICE O/P EST MOD 30 MIN: CPT

## 2020-02-07 NOTE — PHYSICAL EXAM
[de-identified] : General: well-appearing, not in acute distress and not obese. \par Gait: normal. \par Oriented to time, place, person\par Mood: Normal\par Affect: Normal\par \par Left shoulder exam\par \par Inspection: No malalignment, No defects, No atrophy\par Skin: No masses, No lesions\par Neck: Negative Spurling's, full ROM, no pain with ROM\par AROM: FF to 180, abduction to 80, ER to 30, IR to hip\par [id number] arc ROM: ER, IR\par Tenderness: no bicipital tenderness, no tenderness to the greater tuberosity/RTC insertion, no anterior shoulder/lesser tuberosity tenderness\par Strength: 5/5 ER, 5/5 IR in adduction, 5/5 supraspinatus testing, negative Dayton's test\par AC Joint: No ttp/pain with cross arm testing\par Biceps: Speed Negative, Yergusons Negative\par Impingement test: + Pemberton, + Neer \par Stability: Stable\par Vasc: 2+ radial pulse\par Neuro: AIN, PIN, Ulnar nerve intact to motor\par Sensation: Intact to light touch throughout [de-identified] : 3 views of the left shoulder were obtained 01/29/2020  that show no acute fracture or dislocation. There is no glenohumeral and no AC joint degenerative change seen. Type II acromion. There is no significant malalignment. No significant other obvious osseous abnormality, otherwise unremarkable\par \par MRI left shoulder dated 2/4/20 shows degenerative SLAP tear, rotator cuff tendonitis.

## 2020-02-07 NOTE — HISTORY OF PRESENT ILLNESS
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 He obtained MRI and is here for review of results. He fell on to his back and landed on his shoulder. The pain brought on with over the head movements and internal rotation. Localizes the pain to the posterior shoulder. Notes his shoulder feels strained. He is not taking pain medication. Denies numbness or tingling.

## 2020-02-07 NOTE — ADDENDUM
[FreeTextEntry1] : This note was written by Martha Baig on 02/06/2020 acting solely as a scribe for Dr. Lio Emerson.\par \par All medical record entries made by the Scribe were at my, Dr. Lio Emerson, direction and personally dictated by me on 02/06/2020. I have personally reviewed the chart and agree that the record accurately reflects my personal performance of the history, physical exam, assessment and plan.\par

## 2020-02-07 NOTE — DISCUSSION/SUMMARY
[de-identified] : 49 year old male presents with left degenerative SLAP tear.\par \par MRI left shoulder dated 2/4/20 shows degenerative SLAP tear as well as chronic rotator cuff tendonitis. A discussion was had with the patient regarding conservative management including physical therapy. Surgical intervention is not required as there is no signifincant internal derrangement that requires acute intervention. Patient agreeable. \par \par Recommendations: Continued conservative management as previously discussed, including; activity modification, NSAIDs/tylenol/ice p.r.n.\par \par Begin a trial of PT. Rx given.\par \par Follow up 6-8wks prn

## 2020-03-12 ENCOUNTER — APPOINTMENT (OUTPATIENT)
Dept: ORTHOPEDIC SURGERY | Facility: CLINIC | Age: 50
End: 2020-03-12
Payer: COMMERCIAL

## 2020-03-12 PROCEDURE — 99213 OFFICE O/P EST LOW 20 MIN: CPT

## 2020-03-13 NOTE — HISTORY OF PRESENT ILLNESS
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  MRI suggested chronic rotator cuff tendinopathy as well as degenerative labral pathology.  He reports ~20% improvement of pain with PT. He fell on to his back and landed on his shoulder. The pain brought on with internal rotation and abduction. Localizes the pain to the posterior shoulder. He is not taking pain medication. Denies numbness or tingling. He is looking to return to work.

## 2020-03-13 NOTE — REASON FOR VISIT
[Follow-Up Visit] : a follow-up visit for [Shoulder Pain] : shoulder pain [FreeTextEntry2] : Left shoulder pain

## 2020-03-13 NOTE — ADDENDUM
[FreeTextEntry1] : This note was written by Martha Baig on 03/12/2020 acting solely as a scribe for Dr. Lio Emerson.\par \par All medical record entries made by the Scribe were at my, Dr. Lio Emerson, direction and personally dictated by me on 03/12/2020. I have personally reviewed the chart and agree that the record accurately reflects my personal performance of the history, physical exam, assessment and plan.

## 2020-03-13 NOTE — DISCUSSION/SUMMARY
[de-identified] : 49 year old male presents with left degenerative SLAP tear/RTC tendinopathy\par \par A discussion was had with the patient regarding continued conservative management including physical therapy. Surgical intervention is not required as there is no acute injury that warrants aggressive intervention.  Likely chronic dysfunction within the left shoulder, with recent exacerbation.\par \par Recommendations: Continued conservative management as previously discussed, including; activity modification, NSAIDs/tylenol/ice p.r.n. Return to work as able.\par \par Continue PT.\par \par Follow up as needed.

## 2020-03-13 NOTE — PHYSICAL EXAM
[de-identified] : General: well-appearing, not in acute distress and not obese. \par Gait: normal. \par Oriented to time, place, person\par Mood: Normal\par Affect: Normal\par \par Left shoulder exam\par \par Inspection: No malalignment, No defects, No atrophy\par Skin: No masses, No lesions\par Neck: Negative Spurling's, full ROM, no pain with ROM\par AROM: FF to 180, abduction to 80, ER to 80, IR to hip\par Painful arc ROM: IR\par Tenderness: no bicipital tenderness, no tenderness to the greater tuberosity/RTC insertion, no anterior shoulder/lesser tuberosity tenderness\par Strength: 5/5 ER, 5/5 IR in adduction, 5/5 supraspinatus testing with pain, negative Carbon's test\par AC Joint: No ttp/pain with cross arm testing\par Biceps: Speed Negative, Yergusons Negative\par Impingement test: + Pemberton, + Neer \par Stability: Stable\par Vasc: 2+ radial pulse\par Neuro: AIN, PIN, Ulnar nerve intact to motor\par Sensation: Intact to light touch throughout [de-identified] : 3 views of the left shoulder were obtained 01/29/2020  that show no acute fracture or dislocation. There is no glenohumeral and no AC joint degenerative change seen. Type II acromion. There is no significant malalignment. No significant other obvious osseous abnormality, otherwise unremarkable\par \par MRI left shoulder dated 2/4/20 shows degenerative SLAP tear, rotator cuff tendonitis, partial thickness articular bursal tear.

## 2020-04-20 ENCOUNTER — RX RENEWAL (OUTPATIENT)
Age: 50
End: 2020-04-20

## 2020-07-16 ENCOUNTER — RX RENEWAL (OUTPATIENT)
Age: 50
End: 2020-07-16

## 2020-07-28 ENCOUNTER — APPOINTMENT (OUTPATIENT)
Dept: ULTRASOUND IMAGING | Facility: CLINIC | Age: 50
End: 2020-07-28

## 2020-07-28 ENCOUNTER — OUTPATIENT (OUTPATIENT)
Dept: OUTPATIENT SERVICES | Facility: HOSPITAL | Age: 50
LOS: 1 days | End: 2020-07-28
Payer: COMMERCIAL

## 2020-07-28 DIAGNOSIS — Z00.8 ENCOUNTER FOR OTHER GENERAL EXAMINATION: ICD-10-CM

## 2020-07-28 DIAGNOSIS — K08.499 PARTIAL LOSS OF TEETH DUE TO OTHER SPECIFIED CAUSE, UNSPECIFIED CLASS: Chronic | ICD-10-CM

## 2020-07-28 PROCEDURE — 76536 US EXAM OF HEAD AND NECK: CPT

## 2020-07-28 PROCEDURE — 76536 US EXAM OF HEAD AND NECK: CPT | Mod: 26

## 2020-08-11 LAB — TSH SERPL-ACNC: 6.28 UIU/ML

## 2020-08-11 RX ORDER — LEVOTHYROXINE SODIUM 0.05 MG/1
50 TABLET ORAL
Qty: 90 | Refills: 0 | Status: DISCONTINUED | COMMUNITY
Start: 2020-02-03 | End: 2020-08-11

## 2020-08-17 ENCOUNTER — APPOINTMENT (OUTPATIENT)
Dept: CARDIOLOGY | Facility: CLINIC | Age: 50
End: 2020-08-17
Payer: COMMERCIAL

## 2020-08-17 ENCOUNTER — NON-APPOINTMENT (OUTPATIENT)
Age: 50
End: 2020-08-17

## 2020-08-17 VITALS — DIASTOLIC BLOOD PRESSURE: 89 MMHG | SYSTOLIC BLOOD PRESSURE: 138 MMHG | OXYGEN SATURATION: 97 % | HEART RATE: 66 BPM

## 2020-08-17 PROCEDURE — 99215 OFFICE O/P EST HI 40 MIN: CPT

## 2020-08-17 PROCEDURE — 93000 ELECTROCARDIOGRAM COMPLETE: CPT

## 2020-08-17 RX ORDER — TOPIRAMATE 100 MG/1
100 TABLET, FILM COATED ORAL
Qty: 90 | Refills: 0 | Status: ACTIVE | COMMUNITY
Start: 2020-06-20

## 2020-08-18 NOTE — PHYSICAL EXAM
[Well Groomed] : well groomed [Normal Appearance] : normal appearance [General Appearance - Well Developed] : well developed [General Appearance - Well Nourished] : well nourished [No Deformities] : no deformities [Eyelids - No Xanthelasma] : the eyelids demonstrated no xanthelasmas [General Appearance - In No Acute Distress] : no acute distress [Normal Conjunctiva] : the conjunctiva exhibited no abnormalities [Normal Oral Mucosa] : normal oral mucosa [No Oral Pallor] : no oral pallor [Normal Jugular Venous A Waves Present] : normal jugular venous A waves present [No Oral Cyanosis] : no oral cyanosis [Normal Jugular Venous V Waves Present] : normal jugular venous V waves present [No Jugular Venous Hamm A Waves] : no jugular venous hamm A waves [Exaggerated Use Of Accessory Muscles For Inspiration] : no accessory muscle use [Respiration, Rhythm And Depth] : normal respiratory rhythm and effort [Murmurs] : no murmurs present [Heart Rate And Rhythm] : heart rate and rhythm were normal [Auscultation Breath Sounds / Voice Sounds] : lungs were clear to auscultation bilaterally [Heart Sounds] : normal S1 and S2 [Abdomen Tenderness] : non-tender [Abdomen Soft] : soft [Abnormal Walk] : normal gait [Abdomen Mass (___ Cm)] : no abdominal mass palpated [Cyanosis, Localized] : no localized cyanosis [Gait - Sufficient For Exercise Testing] : the gait was sufficient for exercise testing [Nail Clubbing] : no clubbing of the fingernails [Skin Color & Pigmentation] : normal skin color and pigmentation [Petechial Hemorrhages (___cm)] : no petechial hemorrhages [] : no rash [No Venous Stasis] : no venous stasis [Skin Lesions] : no skin lesions [No Skin Ulcers] : no skin ulcer [No Xanthoma] : no  xanthoma was observed [Oriented To Time, Place, And Person] : oriented to person, place, and time [No Murmur] : no murmurs heard [Normal] : normal [Rhythm Regular] : regular [2+] : left 2+ [No Pitting Edema] : no pitting edema present [FreeTextEntry1] : bipolar disorder [Rt] : no varicose veins of the right leg [Lt] : no varicose veins of the left leg

## 2020-08-18 NOTE — HISTORY OF PRESENT ILLNESS
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  CTPA on 12/20/14 showed bilateral PE without evidence of right heart strain.  He denied any immobilization or trauma preceding the new event. \par \par Of note, his father also had a history of DVT following surgeries and was found to have a lupus anticoagulant and heterozygosity for  MTHFR gene, with low levels of protein C and S, as well.\par \par Mr. Velasquez was recently seen by the hematologist who has determined that based on his recurrent history of DVT/PE and his family history that he should remain on anticoagulation life long.\par \par Most recently, patient's PCP has recommended a thyroid ultrasound for a palpable nodule and a sleep study to rule out sleep apnea. He presents today for a routine follow up cardiac evaluation.

## 2020-08-18 NOTE — DISCUSSION/SUMMARY
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) , HTN (stable),  thyroid nodule, moderate aortic root dilatation at 4.8 cm at the sinus of Valsalva (stable) presented for a routine follow up visit.\par - will refer for nuclear stress test to to evaluate for CAD \par BP at goal on Amlodipine 10 mg daily\par \par DVT/PE\par Chronic hypercoagulable condition\par Continue on Xarelto 20mg daily\par Follow with sleep study rule out sleep apnea\par \par Moderate Aortic root dialtion\par Repeat echocardiogram on 11/8/18 was reviewed - stablle from prior - root at 4.8 cm - advised to monitor BP closely (<130/90)\par \par Thyroid nodule- right side?\par Thyroid ultrasound\par \par \par -

## 2020-08-20 ENCOUNTER — RESULT REVIEW (OUTPATIENT)
Age: 50
End: 2020-08-20

## 2020-08-20 ENCOUNTER — OUTPATIENT (OUTPATIENT)
Dept: OUTPATIENT SERVICES | Facility: HOSPITAL | Age: 50
LOS: 1 days | End: 2020-08-20
Payer: COMMERCIAL

## 2020-08-20 ENCOUNTER — APPOINTMENT (OUTPATIENT)
Dept: ULTRASOUND IMAGING | Facility: IMAGING CENTER | Age: 50
End: 2020-08-20
Payer: COMMERCIAL

## 2020-08-20 DIAGNOSIS — E04.1 NONTOXIC SINGLE THYROID NODULE: ICD-10-CM

## 2020-08-20 DIAGNOSIS — K08.499 PARTIAL LOSS OF TEETH DUE TO OTHER SPECIFIED CAUSE, UNSPECIFIED CLASS: Chronic | ICD-10-CM

## 2020-08-20 PROCEDURE — 81445 SO NEO GSAP 5-50DNA/DNA&RNA: CPT

## 2020-08-20 PROCEDURE — 10005 FNA BX W/US GDN 1ST LES: CPT

## 2020-08-20 PROCEDURE — 88173 CYTOPATH EVAL FNA REPORT: CPT

## 2020-08-20 PROCEDURE — 88173 CYTOPATH EVAL FNA REPORT: CPT | Mod: 26

## 2020-08-20 PROCEDURE — 88172 CYTP DX EVAL FNA 1ST EA SITE: CPT

## 2020-08-23 LAB — NON-GYNECOLOGICAL CYTOLOGY STUDY: SIGNIFICANT CHANGE UP

## 2020-08-31 LAB
BLOCK/SPECIMEN ID: SIGNIFICANT CHANGE UP
BRAF GENE MUT ANL BLD/T: DETECTED
HRAS GENE MUT ANL BLD/T: SIGNIFICANT CHANGE UP
KRAS GENE MUT ANL BLD/T: SIGNIFICANT CHANGE UP
NRAS GENE MUT ANL BLD/T: SIGNIFICANT CHANGE UP
PAX8/PPARG: SIGNIFICANT CHANGE UP
RET/PTC1: SIGNIFICANT CHANGE UP
RET/PTC3: SIGNIFICANT CHANGE UP

## 2020-09-08 ENCOUNTER — APPOINTMENT (OUTPATIENT)
Dept: CV DIAGNOSTICS | Facility: HOSPITAL | Age: 50
End: 2020-09-08

## 2020-09-16 NOTE — HISTORY OF PRESENT ILLNESS
[FreeTextEntry1] : "Just here for a physical" [de-identified] : 50 yo man with h/o as below here for CPE.\par Hasn't been sick lately and has been pretty "status quo over the last year".\par Wants to get the flu shot today, and also needs a prescription for an Echo to f/up aortic root dilatation.\par Had last haldol injection 3 weeks ago, another one coming up pretty soon.\par Started zoloft by psychiatrist for Dr. Stuart for depression, doing well.\par Will try to start exercising and try to cook for himself more.\[place].\par No other active issues.

## 2020-09-16 NOTE — ASSESSMENT
[FreeTextEntry1] : 50 yo man with h/o as above including HTN, DVT/PE on chronic AC, aortic root dilatation, bipolar disorder, obesity, subclinical hypothyroidism, here for CPE.\par 1.  CV - bp at goal, check lipids, follows with cardiology, rx echo given to f/up aortic root dilatation\par 2.  Psych - following with psych for bipolar disorder/depression\par 3.  Endo - subclinical hypothyroidism, check TSH on synthroid, check Us thyroid to f/up thyroid nodules and for mildly prominent right thyroid on exam; check vitamin D, hgbA1c for morbid obesity, encouraged diet/exercise, offered nutritionist or weight management but declined both for now\par 4.  Derm - advised derm eval for skin check\par 5.  HCM - check below labs; flu shot today, other vaccines utd\par 6.  RTO 6-12 months

## 2020-09-16 NOTE — HEALTH RISK ASSESSMENT
[0] : 2) Feeling down, depressed, or hopeless: Not at all (0) [Designated Healthcare Proxy] : Designated healthcare proxy [Name: ___] : Health Care Proxy's Name: [unfilled]  [Relationship: ___] : Relationship: [unfilled] [ColonoscopyComments] : not yet

## 2020-09-16 NOTE — PHYSICAL EXAM
[No Acute Distress] : no acute distress [Well Nourished] : well nourished [Well Developed] : well developed [Well-Appearing] : well-appearing [PERRL] : pupils equal round and reactive to light [Normal Oropharynx] : the oropharynx was normal [Normal TMs] : both tympanic membranes were normal [No Lymphadenopathy] : no lymphadenopathy [Supple] : supple [No Respiratory Distress] : no respiratory distress  [No Accessory Muscle Use] : no accessory muscle use [Clear to Auscultation] : lungs were clear to auscultation bilaterally [Normal Rate] : normal rate  [Regular Rhythm] : with a regular rhythm [Normal S1, S2] : normal S1 and S2 [No Murmur] : no murmur heard [No Carotid Bruits] : no carotid bruits [Pedal Pulses Present] : the pedal pulses are present [No Edema] : there was no peripheral edema [Soft] : abdomen soft [Non Tender] : non-tender [Non-distended] : non-distended [No Masses] : no abdominal mass palpated [No HSM] : no HSM [Normal Bowel Sounds] : normal bowel sounds [Normal Supraclavicular Nodes] : no supraclavicular lymphadenopathy [Normal Posterior Cervical Nodes] : no posterior cervical lymphadenopathy [Normal Anterior Cervical Nodes] : no anterior cervical lymphadenopathy [No Joint Swelling] : no joint swelling [No Rash] : no rash [Normal Gait] : normal gait [Normal Affect] : the affect was normal [de-identified] : scattered moles, erythematous macule left earlobe [de-identified] : declines  exam [de-identified] : right thyroid more prominent/firm than left thyroid

## 2020-09-16 NOTE — ADDENDUM
[FreeTextEntry1] : 2/3/20:  Reviewed labs and imaging with pt, nl except TSH 6 elevated (will increase synthroid to 50 mcg and recheck 2 months),  and triglycerides 200 to c/w diet/exercise, Hgb 17.2 to increase fluid intake, other labs nl, thyroid sono showing larger right nodules with moderately suspicious right midpole thyroid nodule, advised 6 month f/up sono, advised pt rto 6 months bp check and will give rx sono at that time.\par 8/11/20:  Reviewed imaging with pt and TSH, TSH 6.28 elevated so would increase synthroid to 75 mcg and recheck in 2-3 months, thyroid US still shows nodule moderately suspicious for malignancy, advised biopsy, rx entered and pt to call to schedule appt.\par 9/16/20:  Reviewed thyroid nodule biopsy with pt, showed atypia of undetermined significance but BRAF mutation positive (correlation with papillary thyroid carcinoma), referred to surgeon Dr. Alva for eval and possible surgical resection.

## 2020-09-16 NOTE — REVIEW OF SYSTEMS
[Fever] : no fever [Fatigue] : no fatigue [Recent Change In Weight] : ~T no recent weight change [Night Sweats] : no night sweats [Itching] : no itching [Pain] : no pain [Vision Problems] : no vision problems [Earache] : no earache [Nosebleeds] : no nosebleeds [Hearing Loss] : no hearing loss [Sore Throat] : no sore throat [Chest Pain] : no chest pain [Palpitations] : no palpitations [Claudication] : no  leg claudication [Orthopena] : no orthopnea [Lower Ext Edema] : no lower extremity edema [Shortness Of Breath] : no shortness of breath [Cough] : no cough [Wheezing] : no wheezing [Nausea] : no nausea [Abdominal Pain] : no abdominal pain [Dyspnea on Exertion] : not dyspnea on exertion [Constipation] : no constipation [Vomiting] : no vomiting [Heartburn] : no heartburn [Melena] : no melena [Incontinence] : no incontinence [Dysuria] : no dysuria [Joint Stiffness] : no joint stiffness [Joint Pain] : no joint pain [Hematuria] : no hematuria [Muscle Weakness] : no muscle weakness [Muscle Pain] : no muscle pain [Joint Swelling] : no joint swelling [Back Pain] : no back pain [Skin Rash] : no skin rash [Hair Changes] : no hair changes [Fainting] : no fainting [Headache] : no headache [Dizziness] : no dizziness [Suicidal] : not suicidal [Unsteady Walk] : no ataxia [Memory Loss] : no memory loss [Depression] : no depression [Insomnia] : no insomnia [Anxiety] : no anxiety [Easy Bruising] : no easy bruising [Easy Bleeding] : no easy bleeding

## 2020-10-06 ENCOUNTER — APPOINTMENT (OUTPATIENT)
Dept: SURGERY | Facility: CLINIC | Age: 50
End: 2020-10-06
Payer: COMMERCIAL

## 2020-10-06 VITALS
HEIGHT: 74 IN | HEART RATE: 91 BPM | WEIGHT: 315 LBS | SYSTOLIC BLOOD PRESSURE: 111 MMHG | DIASTOLIC BLOOD PRESSURE: 79 MMHG | BODY MASS INDEX: 40.43 KG/M2

## 2020-10-06 PROCEDURE — 99204 OFFICE O/P NEW MOD 45 MIN: CPT

## 2020-10-07 NOTE — CONSULT LETTER
[Dear  ___] : Dear  [unfilled], [Consult Letter:] : I had the pleasure of evaluating your patient, [unfilled]. [Please see my note below.] : Please see my note below. [Consult Closing:] : Thank you very much for allowing me to participate in the care of this patient.  If you have any questions, please do not hesitate to contact me. [Sincerely,] : Sincerely, [FreeTextEntry2] : Dr. Maryjo Virk [FreeTextEntry3] : John Alva MD, FACS\par System Director, Endocrine Surgery\par [facility]\par Assistant Clinical Professor of Surgery\par [facility] [employer] [facility]

## 2020-10-07 NOTE — ASSESSMENT
[FreeTextEntry1] : lengthy discussion regarding options for management including active surveillance  vs thyroid lobectomy with paratracheal node dissection, with or without frozen section vs total thyroidectomy with paratracheal node dissection.  risks, benefits and alternatives discussed at length.  I have discussed with the patient the anatomy of the area, the pathophysiology of the disease process and the rationale for surgery.  The attendant risks, possible complications and expected postoperative course have been discussed in detail.  I have given the patient the opportunity to ask questions, and all questions have been answered to the patient's satisfaction.  recommended weight reduction and cessation of smoking preoperatively since is still considered micro cancer and should be optimized prior to surgery.  would need to suspend Xarelto preop if approved by PCP.  sonogram next visit. to return earlier if any change

## 2020-10-07 NOTE — HISTORY OF PRESENT ILLNESS
[de-identified] : 5 year history of thyroid nodule. denies dysphagia, hoarseness or RT exposure.  recent sonogram shows slight enlargement to  9 mm right lobe. FNA showed AUS, BRAF positive

## 2020-10-07 NOTE — PHYSICAL EXAM
[de-identified] : no palpable thyroid nodules [Laryngoscopy Performed] : laryngoscopy was performed, see procedure section for findings [Midline] : located in midline position [Normal] : orientation to person, place, and time: normal [de-identified] : indirect  laryngoscopy shows normal vocal cord mobility bilaterally with no lesions noted

## 2020-12-10 NOTE — ASU PREOPERATIVE ASSESSMENT, ADULT ([facility] ONLY) - IV TYPE/AMT
Gerson Alvarez is a 77 year old male presents to [facility] for pump d/c.    Drug Name: FOLFOX  Cycle/Day: C4D3    Infusion Pump Details: See Documentation Flowsheet   Central Line Care- See LDA in [place]  Camp Needle Removed: Yes    Discharged: Stable     Dr. Acosta is supervising clinician today.        none LR

## 2021-02-22 ENCOUNTER — APPOINTMENT (OUTPATIENT)
Dept: CARDIOLOGY | Facility: CLINIC | Age: 51
End: 2021-02-22

## 2021-03-12 ENCOUNTER — OUTPATIENT (OUTPATIENT)
Dept: OUTPATIENT SERVICES | Facility: HOSPITAL | Age: 51
LOS: 1 days | End: 2021-03-12
Payer: COMMERCIAL

## 2021-03-12 ENCOUNTER — APPOINTMENT (OUTPATIENT)
Dept: INTERNAL MEDICINE | Facility: CLINIC | Age: 51
End: 2021-03-12
Payer: COMMERCIAL

## 2021-03-12 ENCOUNTER — APPOINTMENT (OUTPATIENT)
Dept: ULTRASOUND IMAGING | Facility: CLINIC | Age: 51
End: 2021-03-12

## 2021-03-12 VITALS
DIASTOLIC BLOOD PRESSURE: 80 MMHG | HEIGHT: 74 IN | WEIGHT: 315 LBS | BODY MASS INDEX: 40.43 KG/M2 | HEART RATE: 78 BPM | SYSTOLIC BLOOD PRESSURE: 125 MMHG | OXYGEN SATURATION: 98 %

## 2021-03-12 DIAGNOSIS — K08.499 PARTIAL LOSS OF TEETH DUE TO OTHER SPECIFIED CAUSE, UNSPECIFIED CLASS: Chronic | ICD-10-CM

## 2021-03-12 DIAGNOSIS — E03.9 HYPOTHYROIDISM, UNSPECIFIED: ICD-10-CM

## 2021-03-12 DIAGNOSIS — E55.9 VITAMIN D DEFICIENCY, UNSPECIFIED: ICD-10-CM

## 2021-03-12 DIAGNOSIS — S49.92XD UNSPECIFIED INJURY OF LEFT SHOULDER AND UPPER ARM, SUBSEQUENT ENCOUNTER: ICD-10-CM

## 2021-03-12 DIAGNOSIS — S49.92XA UNSPECIFIED INJURY OF LEFT SHOULDER AND UPPER ARM, INITIAL ENCOUNTER: ICD-10-CM

## 2021-03-12 DIAGNOSIS — Z92.29 PERSONAL HISTORY OF OTHER DRUG THERAPY: ICD-10-CM

## 2021-03-12 PROCEDURE — 99072 ADDL SUPL MATRL&STAF TM PHE: CPT

## 2021-03-12 PROCEDURE — 99396 PREV VISIT EST AGE 40-64: CPT

## 2021-03-12 PROCEDURE — 76536 US EXAM OF HEAD AND NECK: CPT

## 2021-03-12 PROCEDURE — 76536 US EXAM OF HEAD AND NECK: CPT | Mod: 26

## 2021-03-12 RX ORDER — SERTRALINE HYDROCHLORIDE 50 MG/1
50 TABLET, FILM COATED ORAL
Refills: 0 | Status: DISCONTINUED | COMMUNITY
End: 2021-03-12

## 2021-03-30 LAB
25(OH)D3 SERPL-MCNC: 32.9 NG/ML
ALBUMIN SERPL ELPH-MCNC: 4.5 G/DL
ALP BLD-CCNC: 79 U/L
ALT SERPL-CCNC: 36 U/L
ANION GAP SERPL CALC-SCNC: 12 MMOL/L
AST SERPL-CCNC: 18 U/L
BASOPHILS # BLD AUTO: 0.06 K/UL
BASOPHILS NFR BLD AUTO: 0.7 %
BILIRUB SERPL-MCNC: 0.4 MG/DL
BUN SERPL-MCNC: 20 MG/DL
CALCIUM SERPL-MCNC: 10 MG/DL
CHLORIDE SERPL-SCNC: 111 MMOL/L
CHOLEST SERPL-MCNC: 149 MG/DL
CO2 SERPL-SCNC: 20 MMOL/L
CREAT SERPL-MCNC: 0.94 MG/DL
EOSINOPHIL # BLD AUTO: 0.16 K/UL
EOSINOPHIL NFR BLD AUTO: 1.9 %
ESTIMATED AVERAGE GLUCOSE: 100 MG/DL
GLUCOSE SERPL-MCNC: 91 MG/DL
HBA1C MFR BLD HPLC: 5.1 %
HCT VFR BLD CALC: 50.1 %
HDLC SERPL-MCNC: 32 MG/DL
HGB BLD-MCNC: 16.7 G/DL
IMM GRANULOCYTES NFR BLD AUTO: 0.6 %
LDLC SERPL CALC-MCNC: 80 MG/DL
LYMPHOCYTES # BLD AUTO: 2.35 K/UL
LYMPHOCYTES NFR BLD AUTO: 27.4 %
MAN DIFF?: NORMAL
MCHC RBC-ENTMCNC: 30.2 PG
MCHC RBC-ENTMCNC: 33.3 GM/DL
MCV RBC AUTO: 90.6 FL
MONOCYTES # BLD AUTO: 0.7 K/UL
MONOCYTES NFR BLD AUTO: 8.2 %
NEUTROPHILS # BLD AUTO: 5.26 K/UL
NEUTROPHILS NFR BLD AUTO: 61.2 %
NONHDLC SERPL-MCNC: 117 MG/DL
PLATELET # BLD AUTO: 219 K/UL
POTASSIUM SERPL-SCNC: 4.1 MMOL/L
PROT SERPL-MCNC: 6.7 G/DL
RBC # BLD: 5.53 M/UL
RBC # FLD: 12.6 %
SODIUM SERPL-SCNC: 142 MMOL/L
T4 FREE SERPL-MCNC: 1.3 NG/DL
TRIGL SERPL-MCNC: 186 MG/DL
TSH SERPL-ACNC: 2.84 UIU/ML
WBC # FLD AUTO: 8.58 K/UL

## 2021-03-30 NOTE — HISTORY OF PRESENT ILLNESS
[FreeTextEntry1] : physical [de-identified] : 51 yo man with h/o as below here for CPE.\par In the last year, had followed with psychiatry Dr. Stuart and medications were adjusted, had been on haldol and topirimate, then he stopped all of his medications at some point, had mental breakdown on 2/17/21 (6 weeks prior had stopped medications), so mother realized and contacted psychiatrist on 2/20/21 and psychiatrist put him on clonazepam, seroquel, and fluoxetine, and all other medications started again, seroquel now d/c'd.  On short term disability right now.  Now doing better from mental health standpoint.  No S/I, no H/I, no visual or auditory hallucinations.  Will see psych again next week.  \par Will have f/up thyroid sono later today and seeing head and neck surgeon in a few weeks.   \par No other active issues.  Will f/up with cardiology.

## 2021-03-30 NOTE — ASSESSMENT
[FreeTextEntry1] : 51 yo man with h/o as above including morbid obesity, HTN, bipolar disorder, aortic root dilatation, subclinical hypothyroidism, thyroid nodule, DVT/PE on chronic anticoagulation, here for CPE.\par 1.  CV - bp at goal, check lipids, will f/up with cardiology as may need f/up imaging for aortic root\par 2.  Psych - following closely with psychiatry\par 3.  Endo - check TFTs for hypothyroidism, getting thyroid nodule followed closely by head/neck surgeon\par 4.  GI - referred to GI for colonoscopy as due\par 5.  HCM - check below labs; to get covid vaccine, other vaccines utd\par 6.  RTO prn or 1 year

## 2021-03-30 NOTE — REVIEW OF SYSTEMS
[Negative] : Musculoskeletal [Fever] : no fever [Chills] : no chills [Fatigue] : no fatigue [Recent Change In Weight] : ~T no recent weight change [Chest Pain] : no chest pain [Palpitations] : no palpitations [Shortness Of Breath] : no shortness of breath [Cough] : no cough [Dyspnea on Exertion] : not dyspnea on exertion [Abdominal Pain] : no abdominal pain [Nausea] : no nausea [Constipation] : no constipation [Diarrhea] : no diarrhea [Vomiting] : no vomiting [Heartburn] : no heartburn [Melena] : no melena [Dysuria] : no dysuria [Hesitancy] : no hesitancy [Nocturia] : no nocturia [Skin Rash] : no skin rash [Dizziness] : no dizziness [Fainting] : no fainting [Easy Bleeding] : no easy bleeding [Easy Bruising] : no easy bruising [Swollen Glands] : no swollen glands [de-identified] : see hpi

## 2021-03-30 NOTE — ADDENDUM
[FreeTextEntry1] : 3/30/21:  Reviewed labs, nl except slightly elevated triglycerides and low HDL, to work on diet/exercise - mailed to pt.

## 2021-03-30 NOTE — PHYSICAL EXAM
[No Acute Distress] : no acute distress [Well Nourished] : well nourished [Well Developed] : well developed [Well-Appearing] : well-appearing [PERRL] : pupils equal round and reactive to light [EOMI] : extraocular movements intact [No Lymphadenopathy] : no lymphadenopathy [Supple] : supple [No Respiratory Distress] : no respiratory distress  [No Accessory Muscle Use] : no accessory muscle use [Clear to Auscultation] : lungs were clear to auscultation bilaterally [Normal Rate] : normal rate  [Regular Rhythm] : with a regular rhythm [Normal S1, S2] : normal S1 and S2 [No Murmur] : no murmur heard [No Carotid Bruits] : no carotid bruits [Pedal Pulses Present] : the pedal pulses are present [No Edema] : there was no peripheral edema [Normal Appearance] : normal in appearance [Soft] : abdomen soft [Non Tender] : non-tender [Non-distended] : non-distended [No Masses] : no abdominal mass palpated [No HSM] : no HSM [Normal Bowel Sounds] : normal bowel sounds [Normal Supraclavicular Nodes] : no supraclavicular lymphadenopathy [Normal Posterior Cervical Nodes] : no posterior cervical lymphadenopathy [Normal Anterior Cervical Nodes] : no anterior cervical lymphadenopathy [No Joint Swelling] : no joint swelling [No Rash] : no rash [Normal Gait] : normal gait [de-identified] : cerumen impaction right ear [de-identified] : thyromegaly [de-identified] : declines  exam [de-identified] : scattered hyperpigmented macules [de-identified] : slightly flat affect, baseline

## 2021-04-06 ENCOUNTER — APPOINTMENT (OUTPATIENT)
Dept: SURGERY | Facility: CLINIC | Age: 51
End: 2021-04-06
Payer: COMMERCIAL

## 2021-04-06 PROCEDURE — 99072 ADDL SUPL MATRL&STAF TM PHE: CPT

## 2021-04-06 PROCEDURE — 99214 OFFICE O/P EST MOD 30 MIN: CPT

## 2021-04-06 NOTE — HISTORY OF PRESENT ILLNESS
[de-identified] : prior evaluation of subcentimeter thyroid nodule. FNA showed AUS, BRAF positive. recent TFT's normal. recent sonogram stable. had elected active surveillance due to multiple comorbidities. continues Xarelto for life.  I have reviewed all old and new data and available images.

## 2021-04-06 NOTE — ASSESSMENT
[FreeTextEntry1] : wishes to continue active surveillance.  sonogram next visit. patient has been given the opportunity to ask questions, and all of the patient's questions have been answered to their satisfaction  . to return earlier if any change.

## 2021-04-06 NOTE — PHYSICAL EXAM
[de-identified] : no palpable thyroid nodules [Laryngoscopy Performed] : laryngoscopy was performed, see procedure section for findings [Midline] : located in midline position [Normal] : orientation to person, place, and time: normal

## 2021-04-12 ENCOUNTER — RX RENEWAL (OUTPATIENT)
Age: 51
End: 2021-04-12

## 2021-04-12 NOTE — ASU PREOPERATIVE ASSESSMENT, ADULT ([facility] ONLY) - BP NONINVASIVE SYSTOLIC (MM HG)
Sleep lab had an opening for tonight and moved pt. Called pt to have him come in for a Rapid Covid test and pt stated he did not want to come in twice today (once for the rapid and then come back for the study). Pt states he wanted to be put back on his original day. Message sent to Madeline from the sleep lab to let her know.    133

## 2021-04-27 ENCOUNTER — APPOINTMENT (OUTPATIENT)
Dept: CARDIOLOGY | Facility: CLINIC | Age: 51
End: 2021-04-27
Payer: COMMERCIAL

## 2021-04-27 PROCEDURE — 93306 TTE W/DOPPLER COMPLETE: CPT

## 2021-04-27 PROCEDURE — 99072 ADDL SUPL MATRL&STAF TM PHE: CPT

## 2021-04-28 ENCOUNTER — NON-APPOINTMENT (OUTPATIENT)
Age: 51
End: 2021-04-28

## 2021-04-28 ENCOUNTER — APPOINTMENT (OUTPATIENT)
Dept: CARDIOLOGY | Facility: CLINIC | Age: 51
End: 2021-04-28
Payer: COMMERCIAL

## 2021-04-28 VITALS
OXYGEN SATURATION: 97 % | HEART RATE: 75 BPM | DIASTOLIC BLOOD PRESSURE: 84 MMHG | HEIGHT: 74 IN | SYSTOLIC BLOOD PRESSURE: 124 MMHG | BODY MASS INDEX: 40.43 KG/M2 | WEIGHT: 315 LBS

## 2021-04-28 PROCEDURE — 99072 ADDL SUPL MATRL&STAF TM PHE: CPT

## 2021-04-28 PROCEDURE — 99215 OFFICE O/P EST HI 40 MIN: CPT

## 2021-04-28 PROCEDURE — 93000 ELECTROCARDIOGRAM COMPLETE: CPT

## 2021-05-03 ENCOUNTER — RESULT REVIEW (OUTPATIENT)
Age: 51
End: 2021-05-03

## 2021-05-15 ENCOUNTER — APPOINTMENT (OUTPATIENT)
Dept: CT IMAGING | Facility: IMAGING CENTER | Age: 51
End: 2021-05-15
Payer: COMMERCIAL

## 2021-05-15 ENCOUNTER — OUTPATIENT (OUTPATIENT)
Dept: OUTPATIENT SERVICES | Facility: HOSPITAL | Age: 51
LOS: 1 days | End: 2021-05-15
Payer: COMMERCIAL

## 2021-05-15 DIAGNOSIS — Z00.8 ENCOUNTER FOR OTHER GENERAL EXAMINATION: ICD-10-CM

## 2021-05-15 DIAGNOSIS — I77.810 THORACIC AORTIC ECTASIA: ICD-10-CM

## 2021-05-15 DIAGNOSIS — K08.499 PARTIAL LOSS OF TEETH DUE TO OTHER SPECIFIED CAUSE, UNSPECIFIED CLASS: Chronic | ICD-10-CM

## 2021-05-15 PROCEDURE — 71275 CT ANGIOGRAPHY CHEST: CPT | Mod: 26

## 2021-05-15 PROCEDURE — 71275 CT ANGIOGRAPHY CHEST: CPT

## 2021-05-15 PROCEDURE — 82565 ASSAY OF CREATININE: CPT

## 2021-05-24 NOTE — DISCUSSION/SUMMARY
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) , HTN (stable),  thyroid nodule, moderate aortic root dilatation at 4.8 cm at the sinus of Valsalva (stable) presented for a routine follow up visit.\par - had an echo in 1010 yesterday - will call as soon as i get the resluts\par - will refer for nuclear stress test to to evaluate for CAD \par BP at goal on Amlodipine 10 mg daily\par \par DVT/PE\par Chronic hypercoagulable condition\par Continue on Xarelto 20mg daily\par Follow with sleep study rule out sleep apnea\par \par Moderate Aortic root dialtion\par Repeat echocardiogram on 11/8/18 was reviewed - stablle from prior - root at 4.8 cm - advised to monitor BP closely (<130/90)\par \par Thyroid nodule- right side?\par Thyroid ultrasound\par \par \par -

## 2021-07-20 ENCOUNTER — RX RENEWAL (OUTPATIENT)
Age: 51
End: 2021-07-20

## 2021-09-13 ENCOUNTER — OUTPATIENT (OUTPATIENT)
Dept: OUTPATIENT SERVICES | Facility: HOSPITAL | Age: 51
LOS: 1 days | End: 2021-09-13
Payer: COMMERCIAL

## 2021-09-13 ENCOUNTER — APPOINTMENT (OUTPATIENT)
Dept: ULTRASOUND IMAGING | Facility: CLINIC | Age: 51
End: 2021-09-13
Payer: COMMERCIAL

## 2021-09-13 DIAGNOSIS — K08.499 PARTIAL LOSS OF TEETH DUE TO OTHER SPECIFIED CAUSE, UNSPECIFIED CLASS: Chronic | ICD-10-CM

## 2021-09-13 DIAGNOSIS — E04.1 NONTOXIC SINGLE THYROID NODULE: ICD-10-CM

## 2021-09-13 PROCEDURE — 76536 US EXAM OF HEAD AND NECK: CPT

## 2021-09-13 PROCEDURE — 76536 US EXAM OF HEAD AND NECK: CPT | Mod: 26

## 2021-11-23 ENCOUNTER — APPOINTMENT (OUTPATIENT)
Dept: GASTROENTEROLOGY | Facility: CLINIC | Age: 51
End: 2021-11-23

## 2021-12-28 ENCOUNTER — LABORATORY RESULT (OUTPATIENT)
Age: 51
End: 2021-12-28

## 2021-12-28 ENCOUNTER — APPOINTMENT (OUTPATIENT)
Dept: SURGERY | Facility: CLINIC | Age: 51
End: 2021-12-28
Payer: COMMERCIAL

## 2021-12-28 PROCEDURE — 36415 COLL VENOUS BLD VENIPUNCTURE: CPT

## 2021-12-28 PROCEDURE — 99214 OFFICE O/P EST MOD 30 MIN: CPT

## 2021-12-28 NOTE — PHYSICAL EXAM
[de-identified] : no palpable thyroid nodules [Laryngoscopy Performed] : laryngoscopy was performed, see procedure section for findings [Midline] : located in midline position [Normal] : orientation to person, place, and time: normal

## 2021-12-28 NOTE — ASSESSMENT
[FreeTextEntry1] : wishes to continue active surveillance.  sonogram next visit. patient has been given the opportunity to ask questions, and all of the patient's questions have been answered to their satisfaction  . to return earlier if any change.  bloods drawn. to call next week for results.

## 2021-12-28 NOTE — HISTORY OF PRESENT ILLNESS
[de-identified] : prior evaluation of subcentimeter thyroid nodule. FNA showed AUS, BRAF positive.  no changes medically since last visit. . recent sonogram stable. had elected active surveillance due to multiple comorbidities. continues Xarelto for life.  I have reviewed all old and new data and available images.

## 2021-12-29 LAB
T3 SERPL-MCNC: 169 NG/DL
T4 FREE SERPL-MCNC: 1.1 NG/DL
TSH SERPL-ACNC: 9.36 UIU/ML

## 2021-12-30 LAB
THYROGLOB AB SERPL-ACNC: <20 IU/ML
THYROGLOB SERPL-MCNC: 42.4 NG/ML

## 2022-01-06 ENCOUNTER — NON-APPOINTMENT (OUTPATIENT)
Age: 52
End: 2022-01-06

## 2022-01-11 ENCOUNTER — APPOINTMENT (OUTPATIENT)
Dept: GASTROENTEROLOGY | Facility: CLINIC | Age: 52
End: 2022-01-11
Payer: COMMERCIAL

## 2022-01-11 VITALS
HEART RATE: 90 BPM | BODY MASS INDEX: 40.43 KG/M2 | HEIGHT: 74 IN | WEIGHT: 315 LBS | SYSTOLIC BLOOD PRESSURE: 135 MMHG | DIASTOLIC BLOOD PRESSURE: 67 MMHG

## 2022-01-11 DIAGNOSIS — Z12.11 ENCOUNTER FOR SCREENING FOR MALIGNANT NEOPLASM OF COLON: ICD-10-CM

## 2022-01-11 PROCEDURE — 99204 OFFICE O/P NEW MOD 45 MIN: CPT

## 2022-01-11 RX ORDER — AMMONIUM LACTATE 12 %
12 CREAM (GRAM) TOPICAL
Qty: 385 | Refills: 0 | Status: DISCONTINUED | COMMUNITY
Start: 2021-12-21 | End: 2022-01-11

## 2022-01-11 RX ORDER — HALOPERIDOL LACTATE 2 MG/ML
CONCENTRATE, ORAL ORAL
Refills: 0 | Status: DISCONTINUED | COMMUNITY
End: 2022-01-11

## 2022-01-11 RX ORDER — FLUOXETINE HYDROCHLORIDE 40 MG/1
40 CAPSULE ORAL
Qty: 90 | Refills: 0 | Status: DISCONTINUED | COMMUNITY
Start: 2021-10-16 | End: 2022-01-11

## 2022-01-11 RX ORDER — FLUOXETINE HYDROCHLORIDE 20 MG/1
20 TABLET ORAL
Refills: 0 | Status: DISCONTINUED | COMMUNITY
End: 2022-01-11

## 2022-01-11 RX ORDER — CLONAZEPAM 0.5 MG/1
0.5 TABLET ORAL AT BEDTIME
Refills: 0 | Status: DISCONTINUED | COMMUNITY
End: 2022-01-11

## 2022-01-11 NOTE — HISTORY OF PRESENT ILLNESS
[Heartburn] : denies heartburn [Nausea] : denies nausea [Vomiting] : denies vomiting [Diarrhea] : denies diarrhea [Constipation] : denies constipation [Yellow Skin Or Eyes (Jaundice)] : denies jaundice [Abdominal Pain] : denies abdominal pain [Abdominal Swelling] : denies abdominal swelling [Rectal Pain] : denies rectal pain [de-identified] : Torey presents to the office today for evaluation for colon cancer screening.  \par \par He feels well from a GI perspective, and denies any dysphagia, nausea, abdominal pain, change in bowel habits, GI bleeding, weight loss, or family history of colon cancer.  He normally moves his bowels once a day.  He has never had a colonoscopy before.\par \par The patient is maintained on Xarelto after developing a DVT/PE twice.  His BMI > 40 and he has OFELIA and a dilated aortic root.

## 2022-01-11 NOTE — PHYSICAL EXAM
[General Appearance - Alert] : alert [General Appearance - In No Acute Distress] : in no acute distress [Sclera] : the sclera and conjunctiva were normal [Extraocular Movements] : extraocular movements were intact [Outer Ear] : the ears and nose were normal in appearance [Hearing Threshold Finger Rub Not Leon] : hearing was normal [Neck Appearance] : the appearance of the neck was normal [Neck Cervical Mass (___cm)] : no neck mass was observed [Auscultation Breath Sounds / Voice Sounds] : lungs were clear to auscultation bilaterally [Heart Rate And Rhythm] : heart rate was normal and rhythm regular [Heart Sounds] : normal S1 and S2 [Edema] : there was no peripheral edema [Bowel Sounds] : normal bowel sounds [Abdomen Soft] : soft [Abdomen Tenderness] : non-tender [] : no hepato-splenomegaly [Abdomen Mass (___ Cm)] : no abdominal mass palpated [Abdomen Hernia] : no hernia was discovered [Cervical Lymph Nodes Enlarged Anterior Bilaterally] : anterior cervical [Supraclavicular Lymph Nodes Enlarged Bilaterally] : supraclavicular [No CVA Tenderness] : no ~M costovertebral angle tenderness [Abnormal Walk] : normal gait [Skin Color & Pigmentation] : normal skin color and pigmentation [Skin Turgor] : normal skin turgor [No Focal Deficits] : no focal deficits [Oriented To Time, Place, And Person] : oriented to person, place, and time [Impaired Insight] : insight and judgment were intact [FreeTextEntry1] : tachypneic at certain times, ? anxiety related

## 2022-01-13 ENCOUNTER — NON-APPOINTMENT (OUTPATIENT)
Age: 52
End: 2022-01-13

## 2022-01-13 RX ORDER — LEVOTHYROXINE SODIUM 0.07 MG/1
75 TABLET ORAL DAILY
Qty: 90 | Refills: 3 | Status: DISCONTINUED | COMMUNITY
Start: 2020-08-11 | End: 2022-01-13

## 2022-02-17 ENCOUNTER — NON-APPOINTMENT (OUTPATIENT)
Age: 52
End: 2022-02-17

## 2022-03-09 ENCOUNTER — LABORATORY RESULT (OUTPATIENT)
Age: 52
End: 2022-03-09

## 2022-03-10 ENCOUNTER — NON-APPOINTMENT (OUTPATIENT)
Age: 52
End: 2022-03-10

## 2022-03-15 ENCOUNTER — NON-APPOINTMENT (OUTPATIENT)
Age: 52
End: 2022-03-15

## 2022-05-27 ENCOUNTER — APPOINTMENT (OUTPATIENT)
Dept: CARDIOLOGY | Facility: CLINIC | Age: 52
End: 2022-05-27
Payer: COMMERCIAL

## 2022-05-27 ENCOUNTER — APPOINTMENT (OUTPATIENT)
Dept: INTERNAL MEDICINE | Facility: CLINIC | Age: 52
End: 2022-05-27
Payer: COMMERCIAL

## 2022-05-27 VITALS
OXYGEN SATURATION: 98 % | HEART RATE: 85 BPM | SYSTOLIC BLOOD PRESSURE: 120 MMHG | DIASTOLIC BLOOD PRESSURE: 76 MMHG | BODY MASS INDEX: 40.43 KG/M2 | HEIGHT: 74 IN | WEIGHT: 315 LBS

## 2022-05-27 PROCEDURE — 93306 TTE W/DOPPLER COMPLETE: CPT

## 2022-05-27 PROCEDURE — 99396 PREV VISIT EST AGE 40-64: CPT

## 2022-05-27 RX ORDER — FLUOXETINE HYDROCHLORIDE 20 MG/1
20 CAPSULE ORAL
Qty: 180 | Refills: 0 | Status: DISCONTINUED | COMMUNITY
Start: 2021-07-24 | End: 2022-05-27

## 2022-05-27 RX ORDER — HALOPERIDOL 10 MG/1
10 TABLET ORAL
Qty: 90 | Refills: 0 | Status: DISCONTINUED | COMMUNITY
Start: 2021-06-16 | End: 2022-05-27

## 2022-06-08 ENCOUNTER — APPOINTMENT (OUTPATIENT)
Dept: CARDIOLOGY | Facility: CLINIC | Age: 52
End: 2022-06-08

## 2022-06-11 ENCOUNTER — APPOINTMENT (OUTPATIENT)
Dept: ULTRASOUND IMAGING | Facility: IMAGING CENTER | Age: 52
End: 2022-06-11
Payer: COMMERCIAL

## 2022-06-11 ENCOUNTER — OUTPATIENT (OUTPATIENT)
Dept: OUTPATIENT SERVICES | Facility: HOSPITAL | Age: 52
LOS: 1 days | End: 2022-06-11
Payer: COMMERCIAL

## 2022-06-11 DIAGNOSIS — Z00.8 ENCOUNTER FOR OTHER GENERAL EXAMINATION: ICD-10-CM

## 2022-06-11 DIAGNOSIS — K08.499 PARTIAL LOSS OF TEETH DUE TO OTHER SPECIFIED CAUSE, UNSPECIFIED CLASS: Chronic | ICD-10-CM

## 2022-06-11 DIAGNOSIS — E04.1 NONTOXIC SINGLE THYROID NODULE: ICD-10-CM

## 2022-06-11 PROCEDURE — 76536 US EXAM OF HEAD AND NECK: CPT | Mod: 26

## 2022-06-11 PROCEDURE — 76536 US EXAM OF HEAD AND NECK: CPT

## 2022-06-26 ENCOUNTER — RX RENEWAL (OUTPATIENT)
Age: 52
End: 2022-06-26

## 2022-07-05 LAB
ALBUMIN SERPL ELPH-MCNC: 5 G/DL
ALP BLD-CCNC: 87 U/L
ALT SERPL-CCNC: 30 U/L
ANION GAP SERPL CALC-SCNC: 15 MMOL/L
AST SERPL-CCNC: 24 U/L
BASOPHILS # BLD AUTO: 0.13 K/UL
BASOPHILS NFR BLD AUTO: 1.1 %
BILIRUB SERPL-MCNC: 0.4 MG/DL
BUN SERPL-MCNC: 15 MG/DL
CALCIUM SERPL-MCNC: 9.7 MG/DL
CHLORIDE SERPL-SCNC: 107 MMOL/L
CHOLEST SERPL-MCNC: 152 MG/DL
CO2 SERPL-SCNC: 20 MMOL/L
CREAT SERPL-MCNC: 1.18 MG/DL
EGFR: 75 ML/MIN/1.73M2
EOSINOPHIL # BLD AUTO: 0.19 K/UL
EOSINOPHIL NFR BLD AUTO: 1.5 %
ESTIMATED AVERAGE GLUCOSE: 114 MG/DL
GLUCOSE SERPL-MCNC: 91 MG/DL
HBA1C MFR BLD HPLC: 5.6 %
HCT VFR BLD CALC: 50.5 %
HDLC SERPL-MCNC: 33 MG/DL
HGB BLD-MCNC: 17.4 G/DL
IMM GRANULOCYTES NFR BLD AUTO: 0.6 %
LDLC SERPL CALC-MCNC: 93 MG/DL
LYMPHOCYTES # BLD AUTO: 2.95 K/UL
LYMPHOCYTES NFR BLD AUTO: 23.9 %
MAN DIFF?: NORMAL
MCHC RBC-ENTMCNC: 31.2 PG
MCHC RBC-ENTMCNC: 34.5 GM/DL
MCV RBC AUTO: 90.7 FL
MONOCYTES # BLD AUTO: 0.95 K/UL
MONOCYTES NFR BLD AUTO: 7.7 %
NEUTROPHILS # BLD AUTO: 8.04 K/UL
NEUTROPHILS NFR BLD AUTO: 65.2 %
NONHDLC SERPL-MCNC: 119 MG/DL
PLATELET # BLD AUTO: 284 K/UL
POTASSIUM SERPL-SCNC: 4.2 MMOL/L
PROT SERPL-MCNC: 7.2 G/DL
RBC # BLD: 5.57 M/UL
RBC # FLD: 12.6 %
SODIUM SERPL-SCNC: 142 MMOL/L
TRIGL SERPL-MCNC: 131 MG/DL
WBC # FLD AUTO: 12.34 K/UL

## 2022-07-12 ENCOUNTER — APPOINTMENT (OUTPATIENT)
Dept: SURGERY | Facility: CLINIC | Age: 52
End: 2022-07-12

## 2022-07-12 PROCEDURE — 99214 OFFICE O/P EST MOD 30 MIN: CPT

## 2022-07-12 NOTE — HISTORY OF PRESENT ILLNESS
[de-identified] : prior evaluation of subcentimeter thyroid nodule. FNA showed AUS, BRAF positive.  no changes medically since last visit. . recent sonogram stable. recent TFTs normal.  had elected active surveillance due to multiple comorbidities. continues Xarelto for life.  I have reviewed all old and new data and available images.

## 2022-07-12 NOTE — PHYSICAL EXAM
[de-identified] : <1 cm right thyroid nodule, well circumscribed and mobile [Laryngoscopy Performed] : laryngoscopy was performed, see procedure section for findings [Midline] : located in midline position [Normal] : orientation to person, place, and time: normal

## 2022-07-22 ENCOUNTER — APPOINTMENT (OUTPATIENT)
Dept: INTERNAL MEDICINE | Facility: CLINIC | Age: 52
End: 2022-07-22

## 2022-07-22 ENCOUNTER — MED ADMIN CHARGE (OUTPATIENT)
Age: 52
End: 2022-07-22

## 2022-07-22 DIAGNOSIS — Z23 ENCOUNTER FOR IMMUNIZATION: ICD-10-CM

## 2022-07-22 PROCEDURE — 90750 HZV VACC RECOMBINANT IM: CPT

## 2022-07-22 PROCEDURE — 90471 IMMUNIZATION ADMIN: CPT

## 2022-08-10 LAB
BASOPHILS # BLD AUTO: 0.08 K/UL
BASOPHILS NFR BLD AUTO: 1 %
EOSINOPHIL # BLD AUTO: 0.21 K/UL
EOSINOPHIL NFR BLD AUTO: 2.5 %
HCT VFR BLD CALC: 47.1 %
HGB BLD-MCNC: 15.9 G/DL
IMM GRANULOCYTES NFR BLD AUTO: 1.3 %
LYMPHOCYTES # BLD AUTO: 2.33 K/UL
LYMPHOCYTES NFR BLD AUTO: 27.7 %
MAN DIFF?: NORMAL
MCHC RBC-ENTMCNC: 30.8 PG
MCHC RBC-ENTMCNC: 33.8 GM/DL
MCV RBC AUTO: 91.1 FL
MONOCYTES # BLD AUTO: 0.46 K/UL
MONOCYTES NFR BLD AUTO: 5.5 %
NEUTROPHILS # BLD AUTO: 5.23 K/UL
NEUTROPHILS NFR BLD AUTO: 62 %
PLATELET # BLD AUTO: 203 K/UL
RBC # BLD: 5.17 M/UL
RBC # FLD: 12.7 %
WBC # FLD AUTO: 8.42 K/UL

## 2022-08-10 NOTE — ADDENDUM
[FreeTextEntry1] : 6/23/22:  Reviewed labs, nl except WBC 12 and Hgb 17.4 both elevated, would repeat cbc and if persistently elevated would consider heme eval, other labs nl - LM to call back to discuss.\par 7/5/22:  Reviewed labs with pt, nl except above, will check cbc again and if still elevated will refer to heme.\par 8/10/22:  CBC nl - mailed to pt.

## 2022-08-10 NOTE — REVIEW OF SYSTEMS
[Fever] : no fever [Chills] : no chills [Fatigue] : no fatigue [Night Sweats] : no night sweats [Recent Change In Weight] : ~T no recent weight change [Vision Problems] : no vision problems [Hearing Loss] : no hearing loss [Chest Pain] : no chest pain [Palpitations] : no palpitations [Lower Ext Edema] : no lower extremity edema [Orthopena] : no orthopnea [Paroxysmal Nocturnal Dyspnea] : no paroxysmal nocturnal dyspnea [Wheezing] : no wheezing [Cough] : no cough [Abdominal Pain] : no abdominal pain [Nausea] : no nausea [Constipation] : no constipation [Diarrhea] : no diarrhea [Vomiting] : no vomiting [Dysuria] : no dysuria [Hematuria] : no hematuria [Joint Pain] : no joint pain [Muscle Pain] : no muscle pain [Back Pain] : no back pain [Skin Rash] : no skin rash [Headache] : no headache [Dizziness] : no dizziness [Fainting] : no fainting [Suicidal] : not suicidal [Anxiety] : no anxiety [Depression] : no depression [Easy Bleeding] : no easy bleeding [Easy Bruising] : no easy bruising

## 2022-08-10 NOTE — PHYSICAL EXAM
[No Acute Distress] : no acute distress [Well Nourished] : well nourished [Well Developed] : well developed [Well-Appearing] : well-appearing [PERRL] : pupils equal round and reactive to light [EOMI] : extraocular movements intact [Normal Oropharynx] : the oropharynx was normal [Normal TMs] : both tympanic membranes were normal [No Lymphadenopathy] : no lymphadenopathy [Supple] : supple [No Respiratory Distress] : no respiratory distress  [No Accessory Muscle Use] : no accessory muscle use [Clear to Auscultation] : lungs were clear to auscultation bilaterally [Normal Rate] : normal rate  [Regular Rhythm] : with a regular rhythm [Normal S1, S2] : normal S1 and S2 [No Murmur] : no murmur heard [No Carotid Bruits] : no carotid bruits [Pedal Pulses Present] : the pedal pulses are present [No Edema] : there was no peripheral edema [Normal Appearance] : normal in appearance [Soft] : abdomen soft [Non Tender] : non-tender [Non-distended] : non-distended [No Masses] : no abdominal mass palpated [No HSM] : no HSM [Normal Bowel Sounds] : normal bowel sounds [Normal Supraclavicular Nodes] : no supraclavicular lymphadenopathy [Normal Posterior Cervical Nodes] : no posterior cervical lymphadenopathy [Normal Anterior Cervical Nodes] : no anterior cervical lymphadenopathy [No Joint Swelling] : no joint swelling [No Rash] : no rash [Normal Gait] : normal gait [Normal Affect] : the affect was normal [de-identified] : mildly prominent thyroid [de-identified] : declined  exam [de-identified] : m

## 2022-08-10 NOTE — HISTORY OF PRESENT ILLNESS
[FreeTextEntry1] : physical [de-identified] : 50 yo man with h/o as below here for CPE.\par Had echo done this morning to f/up aortic root dilatation.\par Had cologuard done and was ok.\par Working on diet, just lost 10 lbs over last 2 weeks, has to start exercising.  \par Following with Dr. Alva for thyroid nodule and hypothyroidism.\par Still seeing psychiatrist Dr. Stuart and some medications were adjusted.\par No other active issues.  \par

## 2022-08-10 NOTE — ASSESSMENT
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  CV - bp at goal, check lipids, following with cardiology for aortic root dilatation and just had echo done\par 2.  Endo - following with head/neck surgeon for thyroid nodule, TSH nl after increase in synthroid dose recently; reinforced weight loss and referred to weight management; check A1c for obesity\par 3.  Psych - following with psych for bipolar disorder\par 4.  GI - colon cancer screening utd\par 5.  HCM - check other below labs; consider shingrix, other vaccines utd\par 6.  RTO prn or 1 year \par \par \par

## 2022-10-10 ENCOUNTER — APPOINTMENT (OUTPATIENT)
Dept: INTERNAL MEDICINE | Facility: CLINIC | Age: 52
End: 2022-10-10

## 2022-10-10 PROCEDURE — 90471 IMMUNIZATION ADMIN: CPT

## 2022-10-10 PROCEDURE — 90750 HZV VACC RECOMBINANT IM: CPT

## 2022-12-21 ENCOUNTER — RX RENEWAL (OUTPATIENT)
Age: 52
End: 2022-12-21

## 2023-01-14 ENCOUNTER — APPOINTMENT (OUTPATIENT)
Dept: ULTRASOUND IMAGING | Facility: IMAGING CENTER | Age: 53
End: 2023-01-14
Payer: COMMERCIAL

## 2023-01-14 ENCOUNTER — OUTPATIENT (OUTPATIENT)
Dept: OUTPATIENT SERVICES | Facility: HOSPITAL | Age: 53
LOS: 1 days | End: 2023-01-14
Payer: COMMERCIAL

## 2023-01-14 DIAGNOSIS — K08.499 PARTIAL LOSS OF TEETH DUE TO OTHER SPECIFIED CAUSE, UNSPECIFIED CLASS: Chronic | ICD-10-CM

## 2023-01-14 DIAGNOSIS — E04.1 NONTOXIC SINGLE THYROID NODULE: ICD-10-CM

## 2023-01-14 DIAGNOSIS — Z00.8 ENCOUNTER FOR OTHER GENERAL EXAMINATION: ICD-10-CM

## 2023-01-14 PROCEDURE — 76536 US EXAM OF HEAD AND NECK: CPT | Mod: 26

## 2023-01-14 PROCEDURE — 76536 US EXAM OF HEAD AND NECK: CPT

## 2023-01-31 ENCOUNTER — APPOINTMENT (OUTPATIENT)
Dept: SURGERY | Facility: CLINIC | Age: 53
End: 2023-01-31
Payer: COMMERCIAL

## 2023-01-31 PROCEDURE — 99214 OFFICE O/P EST MOD 30 MIN: CPT

## 2023-01-31 PROCEDURE — 36415 COLL VENOUS BLD VENIPUNCTURE: CPT

## 2023-01-31 NOTE — ASSESSMENT
[FreeTextEntry1] : wishes to continue active surveillance.  sonogram next visit. patient has been given the opportunity to ask questions, and all of the patient's questions have been answered to their satisfaction  . to return earlier if any change.  bloods drawn. to call next week for results.   importance of tobacco cessation emphasized

## 2023-01-31 NOTE — HISTORY OF PRESENT ILLNESS
[de-identified] : prior evaluation of subcentimeter thyroid nodule. FNA showed AUS, BRAF positive.  no changes medically since last visit. . recent sonogram stable. had elected active surveillance due to multiple comorbidities. continues Xarelto for life.  I have reviewed all old and new data and available images.

## 2023-01-31 NOTE — PHYSICAL EXAM
[de-identified] : no palpable thyroid nodules [Laryngoscopy Performed] : laryngoscopy was performed, see procedure section for findings [Midline] : located in midline position [Normal] : orientation to person, place, and time: normal

## 2023-02-01 ENCOUNTER — LABORATORY RESULT (OUTPATIENT)
Age: 53
End: 2023-02-01

## 2023-02-02 LAB
CALCIUM SERPL-MCNC: 9.5 MG/DL
CALCIUM SERPL-MCNC: 9.5 MG/DL
PARATHYROID HORMONE INTACT: 41 PG/ML
T3 SERPL-MCNC: 127 NG/DL
T4 FREE SERPL-MCNC: 1.3 NG/DL
THYROGLOB AB SERPL-ACNC: <20 IU/ML
THYROGLOB SERPL-MCNC: 18.8 NG/ML
TSH SERPL-ACNC: 4.14 UIU/ML

## 2023-02-03 ENCOUNTER — NON-APPOINTMENT (OUTPATIENT)
Age: 53
End: 2023-02-03

## 2023-05-31 ENCOUNTER — APPOINTMENT (OUTPATIENT)
Dept: CARDIOLOGY | Facility: CLINIC | Age: 53
End: 2023-05-31
Payer: COMMERCIAL

## 2023-05-31 ENCOUNTER — TRANSCRIPTION ENCOUNTER (OUTPATIENT)
Age: 53
End: 2023-05-31

## 2023-05-31 ENCOUNTER — APPOINTMENT (OUTPATIENT)
Dept: INTERNAL MEDICINE | Facility: CLINIC | Age: 53
End: 2023-05-31
Payer: COMMERCIAL

## 2023-05-31 VITALS
DIASTOLIC BLOOD PRESSURE: 80 MMHG | HEIGHT: 74 IN | HEART RATE: 80 BPM | BODY MASS INDEX: 40.43 KG/M2 | WEIGHT: 315 LBS | OXYGEN SATURATION: 98 % | SYSTOLIC BLOOD PRESSURE: 124 MMHG

## 2023-05-31 DIAGNOSIS — E03.8 OTHER SPECIFIED HYPOTHYROIDISM: ICD-10-CM

## 2023-05-31 DIAGNOSIS — D68.69 OTHER THROMBOPHILIA: ICD-10-CM

## 2023-05-31 DIAGNOSIS — D72.829 ELEVATED WHITE BLOOD CELL COUNT, UNSPECIFIED: ICD-10-CM

## 2023-05-31 PROCEDURE — 93306 TTE W/DOPPLER COMPLETE: CPT

## 2023-05-31 PROCEDURE — 99396 PREV VISIT EST AGE 40-64: CPT

## 2023-05-31 RX ORDER — HALOPERIDOL 10 MG/1
10 TABLET ORAL
Refills: 0 | Status: ACTIVE | COMMUNITY

## 2023-05-31 RX ORDER — HALOPERIDOL DECANOATE 100 MG/ML
100 INJECTION INTRAMUSCULAR
Refills: 0 | Status: DISCONTINUED | COMMUNITY
End: 2023-05-31

## 2023-06-12 ENCOUNTER — APPOINTMENT (OUTPATIENT)
Dept: CARDIOLOGY | Facility: CLINIC | Age: 53
End: 2023-06-12
Payer: COMMERCIAL

## 2023-06-12 ENCOUNTER — NON-APPOINTMENT (OUTPATIENT)
Age: 53
End: 2023-06-12

## 2023-06-12 VITALS
OXYGEN SATURATION: 96 % | DIASTOLIC BLOOD PRESSURE: 81 MMHG | BODY MASS INDEX: 40.43 KG/M2 | HEART RATE: 72 BPM | WEIGHT: 315 LBS | HEIGHT: 74 IN | SYSTOLIC BLOOD PRESSURE: 126 MMHG

## 2023-06-12 DIAGNOSIS — R06.09 OTHER FORMS OF DYSPNEA: ICD-10-CM

## 2023-06-12 PROCEDURE — 93000 ELECTROCARDIOGRAM COMPLETE: CPT

## 2023-06-12 PROCEDURE — 99214 OFFICE O/P EST MOD 30 MIN: CPT | Mod: 25

## 2023-06-12 NOTE — DISCUSSION/SUMMARY
[EKG obtained to assist in diagnosis and management of assessed problem(s)] : EKG obtained to assist in diagnosis and management of assessed problem(s) [FreeTextEntry1] : Torey Velasquez is a 52 year old morbidly obese man with poor hygiene and  a history of hypertension, bipolar disorder, dilated aortic root and DVT/PE 7 years ago following immobilization in hospital, who  redeveloped  DVT/PE in December of 2014 and is being maintained on Xarelto.\par \par He had developed pain in the leg, and an ultrasound showed a DVT in right popliteal vein on 12/18/14.  CTPA on 12/20/14 showed bilateral PE without evidence of right heart strain.  He denied any immobilization or trauma preceding the new event. \par \par Of note, his father also had a history of DVT following surgeries and was found to have a lupus anticoagulant and heterozygosity for  MTHFR gene, with low levels of protein C and S, as well.\par \par Mr. Velasquez was recently seen by the hematologist who has determined that based on his recurrent history of DVT/PE and his family history that he should remain on anticoagulation life long.\par \par Most recently, patient's PCP has recommended a thyroid ultrasound for a palpable nodule and a sleep study to rule out sleep apnea. He presents today for a routine follow up cardiac evaluation. Patient had his routine echo to follow up his dilated aortic root recently\par \par - had an echo on 5/31/23 - stable from 2021 - root at 4.7 cm\par - will refer for nuclear stress test to to evaluate for CAD  - was referred to it in th epast but never got it scheduled\par BP at goal on Amlodipine 10 mg daily\par \par DVT/PE\par Chronic hypercoagulable condition\par Continue on Xarelto 20mg daily\par Follow with sleep study rule out sleep apnea\par \par Moderate Aortic root dialtion\par Repeat echocardiogram on 5/2023was reviewed - stablle from prior - root at 4.7 cm - advised to monitor BP closely (<130/90)\par Encouraged patient to continue healthy exercise and eating habits, focusing on a Mediterranean style of eating and aiming for the recommended 150 minutes per week of moderate physical activity.\par Encouraged the patient to find healthy outlets and coping mechanisms to help manage stress, such as physical activity/exercise, reducing workload if possible, spending time with family and friends, engaging in an enjoyable hobby, or using meditation or mindfulness techniques.\par \par \par

## 2023-06-12 NOTE — HISTORY OF PRESENT ILLNESS
[FreeTextEntry1] : Torey Velasquez is a 52 year old morbidly obese man with poor hygiene and  a history of hypertension, bipolar disorder, dilated aortic root and DVT/PE 7 years ago following immobilization in hospital, who  redeveloped  DVT/PE in December of 2014 and is being maintained on Xarelto.\par \par He had developed pain in the leg, and an ultrasound showed a DVT in right popliteal vein on 12/18/14.  CTPA on 12/20/14 showed bilateral PE without evidence of right heart strain.  He denied any immobilization or trauma preceding the new event. \par \par Of note, his father also had a history of DVT following surgeries and was found to have a lupus anticoagulant and heterozygosity for  MTHFR gene, with low levels of protein C and S, as well.\par \par Mr. Velasquez was recently seen by the hematologist who has determined that based on his recurrent history of DVT/PE and his family history that he should remain on anticoagulation life long.\par \par Most recently, patient's PCP has recommended a thyroid ultrasound for a palpable nodule and a sleep study to rule out sleep apnea. He presents today for a routine follow up cardiac evaluation. Patient had his routine echo to follow up his dilated aortic root recently

## 2023-06-25 ENCOUNTER — NON-APPOINTMENT (OUTPATIENT)
Age: 53
End: 2023-06-25

## 2023-06-26 ENCOUNTER — OUTPATIENT (OUTPATIENT)
Dept: OUTPATIENT SERVICES | Facility: HOSPITAL | Age: 53
LOS: 1 days | End: 2023-06-26
Payer: COMMERCIAL

## 2023-06-26 ENCOUNTER — APPOINTMENT (OUTPATIENT)
Dept: CV DIAGNOSTICS | Facility: HOSPITAL | Age: 53
End: 2023-06-26

## 2023-06-26 DIAGNOSIS — R06.09 OTHER FORMS OF DYSPNEA: ICD-10-CM

## 2023-06-26 DIAGNOSIS — K08.499 PARTIAL LOSS OF TEETH DUE TO OTHER SPECIFIED CAUSE, UNSPECIFIED CLASS: Chronic | ICD-10-CM

## 2023-06-26 PROCEDURE — 93016 CV STRESS TEST SUPVJ ONLY: CPT

## 2023-06-26 PROCEDURE — A9500: CPT

## 2023-06-26 PROCEDURE — 78452 HT MUSCLE IMAGE SPECT MULT: CPT

## 2023-06-26 PROCEDURE — 78452 HT MUSCLE IMAGE SPECT MULT: CPT | Mod: 26

## 2023-06-26 PROCEDURE — 93018 CV STRESS TEST I&R ONLY: CPT

## 2023-07-04 LAB
ALBUMIN SERPL ELPH-MCNC: 4.6 G/DL
ALP BLD-CCNC: 90 U/L
ALT SERPL-CCNC: 21 U/L
ANION GAP SERPL CALC-SCNC: 13 MMOL/L
AST SERPL-CCNC: 17 U/L
BILIRUB SERPL-MCNC: 0.3 MG/DL
BUN SERPL-MCNC: 11 MG/DL
CALCIUM SERPL-MCNC: 9.4 MG/DL
CHLORIDE SERPL-SCNC: 109 MMOL/L
CHOLEST SERPL-MCNC: 164 MG/DL
CO2 SERPL-SCNC: 22 MMOL/L
CREAT SERPL-MCNC: 1.08 MG/DL
EGFR: 83 ML/MIN/1.73M2
ESTIMATED AVERAGE GLUCOSE: 111 MG/DL
GLUCOSE SERPL-MCNC: 113 MG/DL
HBA1C MFR BLD HPLC: 5.5 %
HDLC SERPL-MCNC: 39 MG/DL
LDLC SERPL CALC-MCNC: 93 MG/DL
NONHDLC SERPL-MCNC: 126 MG/DL
POTASSIUM SERPL-SCNC: 4.3 MMOL/L
PROT SERPL-MCNC: 6.7 G/DL
SODIUM SERPL-SCNC: 144 MMOL/L
T4 FREE SERPL-MCNC: 1.3 NG/DL
TRIGL SERPL-MCNC: 162 MG/DL
TSH SERPL-ACNC: 2.31 UIU/ML

## 2023-07-04 NOTE — ADDENDUM
[FreeTextEntry1] : 7/4/23:  Labs reviewed, nl except WBC 10.6 minimally elevated but higher previously (?from obesity), other labs nl - mailed to pt.

## 2023-07-04 NOTE — REVIEW OF SYSTEMS
[Nocturia] : nocturia [Negative] : Musculoskeletal [Fever] : no fever [Chills] : no chills [Fatigue] : no fatigue [Recent Change In Weight] : ~T no recent weight change [Chest Pain] : no chest pain [Palpitations] : no palpitations [Shortness Of Breath] : no shortness of breath [Cough] : no cough [Dyspnea on Exertion] : not dyspnea on exertion [Abdominal Pain] : no abdominal pain [Nausea] : no nausea [Constipation] : no constipation [Diarrhea] : no diarrhea [Vomiting] : no vomiting [Heartburn] : no heartburn [Melena] : no melena [Dysuria] : no dysuria [Hesitancy] : no hesitancy [Impotence] : no impotency [Skin Rash] : no skin rash [Headache] : no headache [Dizziness] : no dizziness [Fainting] : no fainting [Easy Bleeding] : no easy bleeding [Easy Bruising] : no easy bruising [FreeTextEntry3] : sees optho [FreeTextEntry8] : nocturia 2-3 times/night, ongoing, no incomplete voiding [de-identified] : see hpi

## 2023-07-04 NOTE — ASSESSMENT
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  CV - bp at goal, following with cardiology and having echo done today to f/up aortic root dilatation; check lipids\par 2.  Endo - check A1c given obesity; tfts to f/up subclinical hypothyroidism, had thyroid sono this year and followed by head/neck surgeon (active surveillance given AUS, BRAF positive); asked about weight loss medication, would be hesitant to give GLP1 given thyroid nodule, already on topamax, would avoid bupropion-naltrexone unless discussed with psychiatrist, ?consider orlistat, to work on diet/exercise further, decreased carb intake\par 3.  GI - cologuard utd, due 3 years for repeat\par 4.  Heme - c/w AC for hypercoaguable state with prior DVT/PE\par 5. Psych - sees psych for bipolar disorder and will ask behavioral health team to give resources for new psychiatrists\par 6.  HCM - check below labs, to get covid bivalent booster if hasn't received, other vaccines utd; to see derm for skin check\par 7.  RTO prn or 6-12 months

## 2023-07-04 NOTE — HISTORY OF PRESENT ILLNESS
[FreeTextEntry1] : physical [de-identified] : 51 yo man with h/o as below here for CPE.\par Here for physical, no new issues.\par Psychiatrist might be retiring.  \par On new medications, switched from haldol IM injection to po form daily.\par No other active issues.  Mood has been ok.  Will get echo later today to f/up aortic root dilatation.\par Starting to cook at home, having trouble losing weight with diet alone, metabolism is slow, did join a gym.  Has smoothie for breakfast, lunch is turkey sandwich, dinner is chicken or pasta or tuna, eats salad, will eat an apple or banana, not that big of an eater.  Asking about medication for weight loss.

## 2023-07-04 NOTE — PHYSICAL EXAM
[No Acute Distress] : no acute distress [Well Nourished] : well nourished [Well Developed] : well developed [Well-Appearing] : well-appearing [PERRL] : pupils equal round and reactive to light [EOMI] : extraocular movements intact [Normal Oropharynx] : the oropharynx was normal [Normal TMs] : both tympanic membranes were normal [No Lymphadenopathy] : no lymphadenopathy [Supple] : supple [No Respiratory Distress] : no respiratory distress  [No Accessory Muscle Use] : no accessory muscle use [Clear to Auscultation] : lungs were clear to auscultation bilaterally [Normal Rate] : normal rate  [Regular Rhythm] : with a regular rhythm [Normal S1, S2] : normal S1 and S2 [No Murmur] : no murmur heard [No Carotid Bruits] : no carotid bruits [Pedal Pulses Present] : the pedal pulses are present [No Edema] : there was no peripheral edema [Normal Appearance] : normal in appearance [Soft] : abdomen soft [Non Tender] : non-tender [Non-distended] : non-distended [No Masses] : no abdominal mass palpated [No HSM] : no HSM [Normal Bowel Sounds] : normal bowel sounds [Normal Supraclavicular Nodes] : no supraclavicular lymphadenopathy [Normal Posterior Cervical Nodes] : no posterior cervical lymphadenopathy [Normal Anterior Cervical Nodes] : no anterior cervical lymphadenopathy [No Joint Swelling] : no joint swelling [No Rash] : no rash [Normal Gait] : normal gait [Normal Affect] : the affect was normal [de-identified] : cerumen impaction right auditory canal [de-identified] : +thyromegaly [de-identified] : declined  exam [de-identified] : scattered hyperpigmented macules

## 2023-07-04 NOTE — HEALTH RISK ASSESSMENT
[0] : 2) Feeling down, depressed, or hopeless: Not at all (0) [ColonoscopyComments] : cologuarandall utd, done 2/22, due 2/25

## 2023-10-03 ENCOUNTER — APPOINTMENT (OUTPATIENT)
Dept: ORTHOPEDIC SURGERY | Facility: CLINIC | Age: 53
End: 2023-10-03
Payer: COMMERCIAL

## 2023-10-03 VITALS — BODY MASS INDEX: 40.43 KG/M2 | HEIGHT: 74 IN | WEIGHT: 315 LBS

## 2023-10-03 PROCEDURE — 99203 OFFICE O/P NEW LOW 30 MIN: CPT

## 2023-10-03 PROCEDURE — 73080 X-RAY EXAM OF ELBOW: CPT | Mod: LT

## 2023-12-15 ENCOUNTER — RX RENEWAL (OUTPATIENT)
Age: 53
End: 2023-12-15

## 2023-12-15 RX ORDER — LEVOTHYROXINE SODIUM 0.11 MG/1
112 TABLET ORAL DAILY
Qty: 90 | Refills: 3 | Status: ACTIVE | COMMUNITY
Start: 2022-01-13 | End: 1900-01-01

## 2023-12-30 ENCOUNTER — APPOINTMENT (OUTPATIENT)
Dept: ULTRASOUND IMAGING | Facility: CLINIC | Age: 53
End: 2023-12-30
Payer: COMMERCIAL

## 2023-12-30 ENCOUNTER — OUTPATIENT (OUTPATIENT)
Dept: OUTPATIENT SERVICES | Facility: HOSPITAL | Age: 53
LOS: 1 days | End: 2023-12-30
Payer: COMMERCIAL

## 2023-12-30 DIAGNOSIS — Z00.8 ENCOUNTER FOR OTHER GENERAL EXAMINATION: ICD-10-CM

## 2023-12-30 DIAGNOSIS — E04.1 NONTOXIC SINGLE THYROID NODULE: ICD-10-CM

## 2023-12-30 DIAGNOSIS — K08.499 PARTIAL LOSS OF TEETH DUE TO OTHER SPECIFIED CAUSE, UNSPECIFIED CLASS: Chronic | ICD-10-CM

## 2023-12-30 PROCEDURE — 76536 US EXAM OF HEAD AND NECK: CPT | Mod: 26

## 2023-12-30 PROCEDURE — 76536 US EXAM OF HEAD AND NECK: CPT

## 2024-01-26 NOTE — END OF VISIT
Procedure:  D&C HYSTEROCOPY (Uterus)  POLYPECTOMY (Uterus)    Relevant Problems   CARDIO   (+) Mixed hyperlipidemia      GI/HEPATIC   (+) Hiatal hernia      /RENAL   (+) Renal cell carcinoma of right kidney (HCC)      MUSCULOSKELETAL   (+) Hiatal hernia      NEURO/PSYCH   (+) Anxiety   (+) Chronic right shoulder pain        Physical Exam    Airway    Mallampati score: I  TM Distance: >3 FB  Neck ROM: full     Dental   No notable dental hx     Cardiovascular  Rhythm: regular, Rate: normal, Cardiovascular exam normal    Pulmonary  Pulmonary exam normal Breath sounds clear to auscultation    Other Findings  post-pubertal.      Anesthesia Plan  ASA Score- 2     Anesthesia Type- IV sedation with anesthesia with ASA Monitors.         Additional Monitors:     Airway Plan:            Plan Factors-Exercise tolerance (METS): >4 METS.    Chart reviewed. EKG reviewed. Imaging results reviewed. Existing labs reviewed. Patient summary reviewed.    Patient is not a current smoker.  Patient did not smoke on day of surgery.            Induction- intravenous.    Postoperative Plan-     Informed Consent- Anesthetic plan and risks discussed with patient.  I personally reviewed this patient with the CRNA. Discussed and agreed on the Anesthesia Plan with the CRNA..                
[Time Spent: ___ minutes] : I have spent [unfilled] minutes of time on the encounter.

## 2024-02-06 ENCOUNTER — APPOINTMENT (OUTPATIENT)
Dept: SURGERY | Facility: CLINIC | Age: 54
End: 2024-02-06
Payer: COMMERCIAL

## 2024-02-06 ENCOUNTER — LABORATORY RESULT (OUTPATIENT)
Age: 54
End: 2024-02-06

## 2024-02-06 DIAGNOSIS — E04.1 NONTOXIC SINGLE THYROID NODULE: ICD-10-CM

## 2024-02-06 PROCEDURE — 36415 COLL VENOUS BLD VENIPUNCTURE: CPT

## 2024-02-06 PROCEDURE — 99214 OFFICE O/P EST MOD 30 MIN: CPT

## 2024-02-06 NOTE — HISTORY OF PRESENT ILLNESS
[de-identified] : prior evaluation of subcentimeter thyroid nodule. FNA showed AUS, BRAF positive.  no changes medically since last visit. . recent sonogram shows slight change in nodule and new small nodule. had elected active surveillance due to multiple comorbidities. continues Xarelto for life.  I have reviewed all old and new data and available images.

## 2024-02-06 NOTE — PHYSICAL EXAM
[de-identified] : < 1 cm right thyroid nodule, well circumscribed and mobile [Laryngoscopy Performed] : laryngoscopy was performed, see procedure section for findings [Midline] : located in midline position [Normal] : orientation to person, place, and time: normal

## 2024-02-11 LAB
T3 SERPL-MCNC: 129 NG/DL
T4 FREE SERPL-MCNC: 1.4 NG/DL
THYROGLOB AB SERPL-ACNC: <20 IU/ML
THYROGLOB SERPL-MCNC: 16.8 NG/ML
TSH SERPL-ACNC: 2.77 UIU/ML

## 2024-02-14 ENCOUNTER — NON-APPOINTMENT (OUTPATIENT)
Age: 54
End: 2024-02-14

## 2024-02-24 ENCOUNTER — APPOINTMENT (OUTPATIENT)
Dept: CARDIOLOGY | Facility: CLINIC | Age: 54
End: 2024-02-24
Payer: COMMERCIAL

## 2024-02-24 PROCEDURE — 93306 TTE W/DOPPLER COMPLETE: CPT

## 2024-03-15 ENCOUNTER — NON-APPOINTMENT (OUTPATIENT)
Age: 54
End: 2024-03-15

## 2024-03-30 ENCOUNTER — APPOINTMENT (OUTPATIENT)
Dept: MRI IMAGING | Facility: CLINIC | Age: 54
End: 2024-03-30
Payer: COMMERCIAL

## 2024-03-30 PROCEDURE — A9585: CPT

## 2024-03-30 PROCEDURE — 71555 MRI ANGIO CHEST W OR W/O DYE: CPT

## 2024-04-10 ENCOUNTER — APPOINTMENT (OUTPATIENT)
Dept: CARDIOTHORACIC SURGERY | Facility: CLINIC | Age: 54
End: 2024-04-10
Payer: COMMERCIAL

## 2024-04-10 VITALS
RESPIRATION RATE: 16 BRPM | OXYGEN SATURATION: 96 % | HEART RATE: 64 BPM | SYSTOLIC BLOOD PRESSURE: 136 MMHG | DIASTOLIC BLOOD PRESSURE: 88 MMHG | WEIGHT: 315 LBS | HEIGHT: 74 IN | TEMPERATURE: 98 F | BODY MASS INDEX: 40.43 KG/M2

## 2024-04-10 PROCEDURE — 99204 OFFICE O/P NEW MOD 45 MIN: CPT

## 2024-04-10 NOTE — HISTORY OF PRESENT ILLNESS
[FreeTextEntry1] :  52 year old morbidly obese man with poor hygiene and a history of hypertension, bipolar disorder ,and DVT/PE 7 years ago following immobilization in hospital, who redeveloped DVT/PE in December of 2014 and is being maintained on Xarelto, who presents for an initial evaluation and management of bicuspid AV and dilated aortic root. Patient is under the care of Dr. Shante Sharpe.  Of note, pt's father had a history of DVT following surgeries and was found to have a lupus anticoagulant and heterozygosity for  MTHFR gene, with low levels of protein C and S.  MRA chest 3/30/24 *  Aortic root: 4.9 cm, not significantly changed from prior exam using the same measurement technique. *  Mid ascending aorta at the level of the right pulmonary artery: 3.8 cm *  Transverse arch: 2.9 cm *  Mid descending at the level of the left superior pulmonary vein: 3.8 cm *  Diaphragm level: 3.2 cm   TTE 2/24/24 1. Left ventricular endocardium is not well visualized. Left ventricular systolic function is normal with an ejection fraction visually estimated at 60 to 65%. There are no regional wall motion abnormalities seen. There is concentric remodeling. There is mild (grade 1) left ventricular diastolic dysfunction. 2. The right ventricle is not well visualized. The right ventricle is possibly mildly enlarged. Right ventricular systolic function is possibly mildly reduced. 3. Possible bicuspid aortic valve. No aortic valve stenosis. trace aortic regurgitation. 4. The aortic root at the sinuses of Valsalva is 5.07 cm (indexed 1.86 cm/m, which is normal). The diameter of the ascending aorta is difficult to determine; it was measured at 4.0 cm (indexed 1.47 cm/m) where best visualized, but is possibly greater. 5. Compared to the transthoracic echocardiogram performed on 5/31/2023, the aortic root is slightly more dilated.   Presents today with his mother.  Reports doing and feeling well. Works for [employer] full time, no issues with work.  Denies CP, back pain or SOB.  Father has hx of AAA. Was first told of bicuspid AV 5 years ago with Dr. Sharpe.  His TTE done this year showed changes and was referred to Dr. Garrido. Current smoker.

## 2024-04-10 NOTE — PHYSICAL EXAM
[General Appearance - In No Acute Distress] : in no acute distress [Sclera] : the sclera and conjunctiva were normal [] : no respiratory distress [Respiration, Rhythm And Depth] : normal respiratory rhythm and effort [Exaggerated Use Of Accessory Muscles For Inspiration] : no accessory muscle use [Auscultation Breath Sounds / Voice Sounds] : lungs were clear to auscultation bilaterally [Apical Impulse] : the apical impulse was normal [Heart Rate And Rhythm] : heart rate was normal and rhythm regular [Heart Sounds] : normal S1 and S2 [Abdomen Soft] : soft [Abdomen Tenderness] : non-tender [Involuntary Movements] : no involuntary movements were seen [No Focal Deficits] : no focal deficits [Oriented To Time, Place, And Person] : oriented to person, place, and time [Impaired Insight] : insight and judgment were intact [Affect] : the affect was normal [Mood] : the mood was normal

## 2024-04-10 NOTE — ASSESSMENT
[FreeTextEntry1] : I have reviewed the patient's medical records, diagnostic images and discussed the case. Review of MRA chest 3/30/24 reviewed with pt and mom at time of visit.   Plan  - Follow up with repeat CT in 6 months - Stop smoking and encouraged weight loss - Continue medication regimen. - Follow up with cardiologist and PCP. - Blood pressure management reviewed and discussed - Discussed red flag signs and symptoms that would warrant immediate medical attention.

## 2024-04-10 NOTE — PROCEDURE
[FreeTextEntry1] : Dr. Garrido reviewed the indications for surgery, and used our webpage [email or web address].org <[email or web address].heartprocedures.org> to illustrate the aorta and anatomy of the heart. Those indications are the following: size greater than 5.0 cm, symptomatic aneurysms, family history of aortic dissection or aneurysm death with a size greater than 4.5 cm, other necessary cardiac procedures such as coronary artery bypass grafting or valvular disorders with an aneurysm greater than 4.5 cm, or connective tissue disorders with an aneurysm size greater than 4.5 cm. The patient does not meet size criteria for surgical intervention at the time.  Dr. Garrido discussed activity restrictions with the patient, and would advise exercise at a moderate amount with no heavy lifting over one third of body weight, and avoiding heart rates that exceed 140 beats per minute. In addition, every patient should abstain from tobacco abuse and to avoid all illicit drug use, especially stimulants such as cocaine or methamphetamine. Dr. Garrido also counseled regarding maintaining a healthy heart diet, and losing any excessive weight as this also put undue stress on both the aorta and entire cardiovascular system. First degree family members should be screened for bicuspid valve disease, and ascending aortic aneurysms.   Patient was advised to view the educational video prior to this visit regarding aortic pathology, risk factors, surgical procedures, and lifestyle modifications. Video can be retrieved at [email or web address]=[email or web address].be.

## 2024-04-10 NOTE — DATA REVIEWED
[FreeTextEntry1] : CTA chest 5/15/21 Maximum transverse diameter of the aorta is noted at the level of sinuses of Valsalva and measures 4.2 cm.

## 2024-04-10 NOTE — END OF VISIT
[FreeTextEntry3] : Written by Errol Ratliff NP, acting as a scribe for Dr. Garrido. The documentation recorded by the scribe accurately reflects the service I personally performed and the decisions made by me. Signature Darrius Garrido MD.

## 2024-05-10 ENCOUNTER — APPOINTMENT (OUTPATIENT)
Dept: ORTHOPEDIC SURGERY | Facility: CLINIC | Age: 54
End: 2024-05-10
Payer: OTHER MISCELLANEOUS

## 2024-05-10 VITALS — WEIGHT: 315 LBS | BODY MASS INDEX: 40.43 KG/M2 | HEIGHT: 74 IN

## 2024-05-10 DIAGNOSIS — F31.9 BIPOLAR DISORDER, UNSPECIFIED: ICD-10-CM

## 2024-05-10 PROCEDURE — 99204 OFFICE O/P NEW MOD 45 MIN: CPT

## 2024-05-10 PROCEDURE — 72100 X-RAY EXAM L-S SPINE 2/3 VWS: CPT

## 2024-05-10 NOTE — DISCUSSION/SUMMARY
[Medication Risks Reviewed] : Medication risks reviewed [de-identified] : She will rest and use moist heat.  Cannot take nonsteroidal anti-inflammatory medicines as he is on the blood thinners.  He has been placed on a Medrol Dosepak.  He will call at the end of that and if he has not seen significant improvement it could be repeated.  I discussed that it could cause him to become more manicky but he tells me his bipolar disease has been very stable on his medications for 5 years.

## 2024-05-10 NOTE — HISTORY OF PRESENT ILLNESS
[de-identified] : This 53-year-old male has had some occasional lower back pain in the past for many years that it can occur once a year and last for up to a week and treated with ice and chiropractic.  Recently at work he had to carry a ladder up 9 flights of stairs and developed right-sided lower back pain.  He has not had leg pain nor is he had lower extremity neurologic symptoms of numbness, paresthesias or weakness.  There is no Valsalva effect and he has not had night pain.  The pain is no worse sitting or standing but it is worse walking.  Treatment to date has been ice and Tylenol.  His past medical history is positive for bipolar disease controlled on medication.  He has a history of gout and hypertension.  He had left leg phlebitis with a pulmonary embolism 5 years ago and is on Xarelto.  He is also on Synthroid.  He is a pack-a-day smoker.

## 2024-05-10 NOTE — PHYSICAL EXAM
[de-identified] : He is fully alert and oriented with a normal mood and affect.  He is markedly overweight with a BMI of 43.  He ambulates with a slow and guarded gait.  He can tiptoe and heel walk.  On stance evaluation there is a small flank crease from a moderate scoliosis.  There is no evidence of shortness of breath or respiratory distress.  There is no paravertebral muscle spasm but sidebending is limited by discomfort.  There is no sciatic or trochanteric tenderness.  There are no cutaneous abnormalities or palpable bony defects of the spine.  His lower extremity neurological examination revealed 1+ symmetrical reflexes.  Motor power is normal to manual testing all lower extremity groups and sensation is normal to light touch in all dermatomes.  Straight leg raising is negative to 90 degrees in the sitting position bilaterally.  His hips and his knees have full and painless range of motion with normal stability.  Vascular examination shows no evidence of significant varicosities and there is no [de-identified] : AP and lateral x-rays of the lumbar spine are obtained.  Sagittal alignment is normal.  There are no destructive changes.  He has a minimal scoliosis.

## 2024-05-17 ENCOUNTER — APPOINTMENT (OUTPATIENT)
Dept: ORTHOPEDIC SURGERY | Facility: CLINIC | Age: 54
End: 2024-05-17
Payer: OTHER MISCELLANEOUS

## 2024-05-17 VITALS — HEIGHT: 74 IN | WEIGHT: 315 LBS | BODY MASS INDEX: 40.43 KG/M2

## 2024-05-17 DIAGNOSIS — S39.012A STRAIN OF MUSCLE, FASCIA AND TENDON OF LOWER BACK, INITIAL ENCOUNTER: ICD-10-CM

## 2024-05-17 DIAGNOSIS — M47.816 SPONDYLOSIS W/OUT MYELOPATHY OR RADICULOPATHY, LUMBAR REGION: ICD-10-CM

## 2024-05-17 DIAGNOSIS — M54.50 LOW BACK PAIN, UNSPECIFIED: ICD-10-CM

## 2024-05-17 PROCEDURE — 99213 OFFICE O/P EST LOW 20 MIN: CPT

## 2024-05-17 NOTE — PHYSICAL EXAM
[de-identified] : He is comfortable sitting and straight leg raising is negative to 90 degrees bilaterally.

## 2024-05-17 NOTE — DISCUSSION/SUMMARY
[Medication Risks Reviewed] : Medication risks reviewed [de-identified] : I have prescribed another Medrol Dosepak.  I will see him for follow-up on a as needed basis and he can return to work when his symptoms allow which will hopefully be quite soon.

## 2024-05-17 NOTE — HISTORY OF PRESENT ILLNESS
[de-identified] : I saw him a little over a week ago with complaints of constant bilateral lower back pain after carrying a ladder up 9 flights of stairs.  He had no neurologic signs or symptoms.  He had a prior pulmonary embolism and is on anticoagulation therapy and was placed on a Medrol Dosepak which she is tolerated.  The pain that was constant and graded as a 9 continues to be constant but is described as only an ache at this point. [Pain Location] : pain [Improving] : improving [3] : a maximum pain level of 3/10

## 2024-05-29 ENCOUNTER — APPOINTMENT (OUTPATIENT)
Dept: CARDIOLOGY | Facility: CLINIC | Age: 54
End: 2024-05-29
Payer: COMMERCIAL

## 2024-05-29 VITALS
SYSTOLIC BLOOD PRESSURE: 126 MMHG | DIASTOLIC BLOOD PRESSURE: 88 MMHG | HEIGHT: 74 IN | OXYGEN SATURATION: 95 % | HEART RATE: 76 BPM | WEIGHT: 315 LBS | BODY MASS INDEX: 40.43 KG/M2

## 2024-05-29 DIAGNOSIS — I82.409 ACUTE EMBOLISM AND THROMBOSIS OF UNSPECIFIED DEEP VEINS OF UNSPECIFIED LOWER EXTREMITY: ICD-10-CM

## 2024-05-29 DIAGNOSIS — I26.99 OTHER PULMONARY EMBOLISM W/OUT ACUTE COR PULMONALE: ICD-10-CM

## 2024-05-29 DIAGNOSIS — I77.810 THORACIC AORTIC ECTASIA: ICD-10-CM

## 2024-05-29 PROCEDURE — 99214 OFFICE O/P EST MOD 30 MIN: CPT | Mod: 25

## 2024-05-29 PROCEDURE — 93000 ELECTROCARDIOGRAM COMPLETE: CPT

## 2024-05-29 RX ORDER — NICOTINE 21 MG/24HR
21 PATCH, TRANSDERMAL 24 HOURS TRANSDERMAL DAILY
Qty: 30 | Refills: 0 | Status: ACTIVE | COMMUNITY
Start: 2024-05-29 | End: 1900-01-01

## 2024-05-29 NOTE — CARDIOLOGY SUMMARY
[___] : [unfilled] [de-identified] : TTE 2/24/24 1. Left ventricular endocardium is not well visualized. Left ventricular systolic function is normal with an ejection fraction visually estimated at 60 to 65%. There are no regional wall motion abnormalities seen. There is concentric remodeling. There is mild (grade 1) left ventricular diastolic dysfunction. 2. The right ventricle is not well visualized. The right ventricle is possibly mildly enlarged. Right ventricular systolic function is possibly mildly reduced. 3. Possible bicuspid aortic valve. No aortic valve stenosis. trace aortic regurgitation. 4. The aortic root at the sinuses of Valsalva is 5.07 cm (indexed 1.86 cm/m, which is normal). The diameter of the ascending aorta is difficult to determine; it was measured at 4.0 cm (indexed 1.47 cm/m) where best visualized, but is possibly greater. 5. Compared to the transthoracic echocardiogram performed on 5/31/2023, the aortic root is slightly more dilated. [de-identified] :  CTA chest 5/15/21 Maximum transverse diameter of the aorta is noted at the level of sinuses of Valsalva and measures 4.2 cm.  CTPA on 12/20/14 showed bilateral PE without evidence of right heart strain.  [de-identified] :  ultrasound showed a DVT in right popliteal vein on 12/18/14.

## 2024-05-29 NOTE — HISTORY OF PRESENT ILLNESS
[FreeTextEntry1] : Mr. Velasquez presents in for follow up.  Mr. 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 Patient works for [employer] full time, no issues with work. Father has hx of AAA. Patient was first told of bicuspid AV 5 years ago and recently was evaluated by Dr. Garrido and advised to repeat CT in 6 Months, stop smoking adn weight loss encouraged.

## 2024-05-29 NOTE — DISCUSSION/SUMMARY
[FreeTextEntry1] : In summary , Mr. 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   # HTN: BP Stable Continue Amlodipine 10 mg QD  Encouraged Patient to monitor BP at home. Additionally, encouraged a heart-healthy diet and exercise as tolerated. EKG with no acute changes.    #DVT/PE : Continue chronic suppressive therapy  Continue on Xarelto 20mg daily  #  Moderate Aortic root dilatation: Appreciate Dr. Pierre's reccs  Patient to f/u with Vasc surgery ( CT ) in 6 months  # Smoking cessation encouraged: Currenty smokes 1 packet / daily  advised smoking cessation  Nicotene patch prescribed.

## 2024-06-12 ENCOUNTER — RX RENEWAL (OUTPATIENT)
Age: 54
End: 2024-06-12

## 2024-06-28 ENCOUNTER — APPOINTMENT (OUTPATIENT)
Dept: INTERNAL MEDICINE | Facility: CLINIC | Age: 54
End: 2024-06-28

## 2024-06-28 ENCOUNTER — OUTPATIENT (OUTPATIENT)
Dept: OUTPATIENT SERVICES | Facility: HOSPITAL | Age: 54
LOS: 1 days | End: 2024-06-28

## 2024-06-28 VITALS
BODY MASS INDEX: 40.43 KG/M2 | HEIGHT: 74 IN | DIASTOLIC BLOOD PRESSURE: 76 MMHG | HEART RATE: 58 BPM | WEIGHT: 315 LBS | SYSTOLIC BLOOD PRESSURE: 128 MMHG | OXYGEN SATURATION: 98 %

## 2024-06-28 DIAGNOSIS — I10 ESSENTIAL (PRIMARY) HYPERTENSION: ICD-10-CM

## 2024-06-28 DIAGNOSIS — K08.499 PARTIAL LOSS OF TEETH DUE TO OTHER SPECIFIED CAUSE, UNSPECIFIED CLASS: Chronic | ICD-10-CM

## 2024-06-28 DIAGNOSIS — Z00.00 ENCOUNTER FOR GENERAL ADULT MEDICAL EXAMINATION W/OUT ABNORMAL FINDINGS: ICD-10-CM

## 2024-06-28 DIAGNOSIS — Z87.39 PERSONAL HISTORY OF OTHER DISEASES OF THE MUSCULOSKELETAL SYSTEM AND CONNECTIVE TISSUE: ICD-10-CM

## 2024-06-28 DIAGNOSIS — E66.9 OBESITY, UNSPECIFIED: ICD-10-CM

## 2024-06-28 LAB
ALBUMIN SERPL ELPH-MCNC: 4.3 G/DL
ALP BLD-CCNC: 85 U/L
ALT SERPL-CCNC: 25 U/L
ANION GAP SERPL CALC-SCNC: 11 MMOL/L
AST SERPL-CCNC: 17 U/L
BASOPHILS # BLD AUTO: 0.09 K/UL
BASOPHILS NFR BLD AUTO: 1 %
BILIRUB SERPL-MCNC: 0.4 MG/DL
BUN SERPL-MCNC: 13 MG/DL
CALCIUM SERPL-MCNC: 9.1 MG/DL
CHLORIDE SERPL-SCNC: 109 MMOL/L
CHOLEST SERPL-MCNC: 176 MG/DL
CO2 SERPL-SCNC: 22 MMOL/L
CREAT SERPL-MCNC: 1.02 MG/DL
EGFR: 88 ML/MIN/1.73M2
EOSINOPHIL # BLD AUTO: 0.31 K/UL
EOSINOPHIL NFR BLD AUTO: 3.4 %
ESTIMATED AVERAGE GLUCOSE: 105 MG/DL
GLUCOSE SERPL-MCNC: 95 MG/DL
HBA1C MFR BLD HPLC: 5.3 %
HCT VFR BLD CALC: 49.5 %
HCV AB SER QL: NONREACTIVE
HCV S/CO RATIO: 0.06 S/CO
HDLC SERPL-MCNC: 35 MG/DL
HGB BLD-MCNC: 16.5 G/DL
IMM GRANULOCYTES NFR BLD AUTO: 1.4 %
LDLC SERPL CALC-MCNC: 107 MG/DL
LYMPHOCYTES # BLD AUTO: 2.64 K/UL
LYMPHOCYTES NFR BLD AUTO: 29.1 %
MAN DIFF?: NORMAL
MCHC RBC-ENTMCNC: 31.2 PG
MCHC RBC-ENTMCNC: 33.3 GM/DL
MCV RBC AUTO: 93.6 FL
MONOCYTES # BLD AUTO: 0.7 K/UL
MONOCYTES NFR BLD AUTO: 7.7 %
NEUTROPHILS # BLD AUTO: 5.19 K/UL
NEUTROPHILS NFR BLD AUTO: 57.4 %
NONHDLC SERPL-MCNC: 141 MG/DL
PLATELET # BLD AUTO: 225 K/UL
POTASSIUM SERPL-SCNC: 4.2 MMOL/L
PROT SERPL-MCNC: 6.6 G/DL
RBC # BLD: 5.29 M/UL
RBC # FLD: 12.8 %
SODIUM SERPL-SCNC: 142 MMOL/L
TRIGL SERPL-MCNC: 192 MG/DL
WBC # FLD AUTO: 9.06 K/UL

## 2024-06-28 PROCEDURE — 99396 PREV VISIT EST AGE 40-64: CPT

## 2024-06-28 PROCEDURE — G0463: CPT

## 2024-07-29 ENCOUNTER — NON-APPOINTMENT (OUTPATIENT)
Age: 54
End: 2024-07-29

## 2024-07-29 NOTE — CONSULT LETTER
[FreeTextEntry1] : Dr Maryjo Virk [FreeTextEntry2] : Thyroid Nodule  [FreeTextEntry3] : Pt is a 54 yo male with h/o HTN,  bipolar disorder PE/DVT  with h/o thyroid nodule being followed by Dr Alva. Pt had FNA  of  0.9 cm right lobe nodule in 2020 which showed  done in 2020. Cytopathology showed  Atypia of Undetermined Significance  Category 3.   7 gene analysis from [employer] [facility] showed nodule  contains BRAF mutation V600 ( cannot distinguish between  V600 E and V600K on this panel)  Given patient with thromboembolic disease, pt  had chosen to undergo active surveillance with Dr Alva rather than surgical excision.  Econsult requested for consideration of GLP1RA for management of morbid obesity given these findings   PMH Hypothyroidism  Thyroid nodules  Bicuspid aortic valve  HTN  LAFB OFELIA Obesity  Bipolar  disorder  PE/DVT  PSH Knee arthroscopy oral surgery   FH  Father  DVT  cardiac disorder  MTHFR mutation  Aunt lung Ca obesity sister     Soc HX  former smoker  no alcohol uses  Meds Amlodipine Haloperidol  Levothyroxine 0.112 mg qd  Prozac Tpoiramate Xarelto  Vitamin D   All PCN -rash     	 Hussein Accession Number : [id number]  PLACIDO CHAN                    2    Cytopathology Report      Final Diagnosis THYROID, RIGHT, US GUIDED FNA ATYPIA OF UNDETERMINED SIGNIFICANCE (AUS) (Category III). Cytologic Atypia Moderately cellular specimen consisting of groups of follicular cells with nuclear enlargement, pale chromatin, nuclear grooves and irregular nuclear contours.  Nuclear pseudoinclusions are ill-formed and not readily identified. The background shows no colloid or lymphocytes. Recommend correlation with nodule size and complexity on ultrasound to assist with further management. Suggest follow up with a repeat sample after appropriate interval of observation for further evaluation, as clinically warranted. A sample is also submitted for thyroid molecular gene panel. The Dixon System for Reporting Thyroid Cytopathology: Implied Risk of Malignancy and Recommended Clinical Management**  Diagnostic Category Risk of Malignancy (%) Usual Management*  I. Non Diagnostic 5-10 Repeat FNA with US guidance  II. Benign 0-3 Clinical follow up  III. Atypia of Undetermined Significance or Follicular Lesion of Undetermined Significance 10-30 Repeat FNA , molecular testing, or lobectomy IV. Follicular neoplasm  or Suspicious for Follicular Neoplasm 25-40 Molecular testing, lobectomy V. Suspicious for Malignancy 50-75 Near-total thyroidectomy or lobectomy  VI. Malignant 97-99 Near-total thyroidectomy or lobectomy   *Actual management may depend on other factors (e.g. clinical, sonographic) besides the FNA interpretation. **Adapted from MONY Randolph., and Yanna LEBRON S. (2018). The Dixon System for Reporting Thyroid Cytopathology: Definitions, criteria, and explanatory notes.       PLACIDO CHAN                    2    Cytopathology Report     Ita: Joel  Screened by: Carmenza BEJARANO(ASCP) Verified by: Venessa Jaime MD (Electronic Signature) Reported on: 08/23/20 18:50 EDT, [address] [place] [address] Phone: [phone]   Fax: [phone] Cytology technical processing performed at [address] [place] [address] _________________________________________________________________   Specimen(s) Submitted THYROID, RIGHT, US GUIDED FNA   Statement of Adequacy Immediate cytologic study for adequacy of specimen using a Diff- Quik stain was performed at [facility], [address] [place] [address]. FNA acceptable for further evaluation by DARCI Malloy([facility]).   Clinical Information 0.9 cm solid with calcification right thyroid nodule. E04.1   Gross Description Received: Needle rinses  in CytoLyt. 20 ml of red fluid. 6 Smear received ( 3 air dried and 3 fixed in alcohol)  CytoLyt received for reflex Molecular Thyroid Cancer Panel.  Ordered by: SOFYA BAUER       Collected/Examined: [id number] 06:18PM       Verified by: SOFYA BAUER [id number] 04:40PM       Result Communication: Discussed results with patient; Stage: Final       Performed at: [facility]       Resulted: [id number] 06:50PM       Last Updated: [id number] 04:40PM       Accession: [id number]        Hussein Accession Number : [id number]  PLACIDO CHAN                    4    Cytopathology Addendum Report     Addendum This addendum is being issued to document results of Thyroid molecular panel performed at [facility] [place], [place]  Test Name Result Collected Date Result Date BLK/SpecID SEE NOTE f 08/20/20 09:26 EDT 08/31/20 11:05 EDT BLK/SpecID Comment:                                  10-FN-20- [id number] BRAF Mutat. DETECTED f * 08/20/20 09:26 EDT 08/31/20 11:05 EDT BRAF Mutat. Comment: A BRAF V600 mutation is DETECTED. The test detects BRAF V600E and V600K mutations but cannot distinguish between them. Additional BRAF V600 mutations are not tested.  Bhavna Lao M.D. Medical Director, Molecular Oncology HRAS Mutation NOT DETECTED f 08/20/20 09:26 EDT 08/31/20 11:05 EDT HRAS Mutation Comment: No mutations were detected in HRAS exon 2 (including codons 12 # 13) and exon 3 (including codon 61).  Bhavna Lao M.D. Medical Director, Molecular Oncology KRAS Mutation NOT DETECTED f 08/20/20 09:26 EDT 08/31/20 11:05 EDT KRAS Mutation Comment: No mutations were detected in KRAS exon 2 (including codons 12 # 13) and exon 3 (including codon 61).  Bhavna Lao M.D. Medical Director, Molecular Oncology NRAS Mutation NOT DETECTED f 08/20/20 09:26 EDT 08/31/20 11:05 EDT NRAS Mutation Comment: No mutations were detected in NRAS exon 2 (including codons 12 # 13) and exon 3 (including codon 61).  Bhavna Lao M.D. Medical Director, Molecular Oncology RET/PTC1 NOT DETECTED 08/20/20 09:26 EDT 08/31/20 11:05 EDT RET/PTC3 NOT DETECTED f 08/20/20 09:26 EDT 08/31/20 11:05 EDT RET/PTC3 Comment: Bhavna Lao M.D. Medical Director, Molecular Oncology PAX8/PPARG NOT DETECTED f 08/20/20 09:26 EDT 08/31/20 11:05 EDT PAX8/PPARG Comment: No overexpression of the PPARG transcript is detected      PLACIDO CHAN                    4    Cytopathology Addendum Report     consistent with the absence of a PAX8/PPARG translocation.  Bhavna Lao M.D. Medical Director, Molecular Oncology Interpretation and Methodology The thyroid mutation panel assesses for 7 of the most common mutations or rearrangements associated with thyroid neoplasia. The BRAF codon 600 mutation and RET/PTC1 and RET/PTC3 rearrangements are highly associated with papillary thyroid cancer, the PAX8-PPAR[gamma] translocation with follicular carcinomas and mutation in HRAS, KRAS or NRAS most often with follicular neoplasms. The BRAF assay tests for codon 600 BRAF mutation using a DNA-based mutation specific PCR method, with an approximate sensitivity of 0.1% mutation-bearing cells in a mixed sample. The LENA panel tests for mutations in codons 12 and 13 (exon 2) and codon 61 (exons 3) of HRAS, KRAS and NRAS by a DNA-based sequencing method, with an approximate sensitivity of 3 to 6% mutation-bearing cells in a mixed sample. This assay will not detect the rare LENA mutations in codons besides 12, 13 and 61. RET/PTC1 and RET/PTC3 rearrangements are detected using a reverse transcription PCR assay. The lower limit of sensitivity is approximately 2.5% mutation-bearing cells in a mixed sample, but can vary due to RNA preservation. PPARG transcripts in relation to a control gene are detected by a RNA-based reverse transcription PCR-based method, with elevated PPARG levels indicative of PAX8/PPARG translocation. The lower limit of sensitivity has not been fully established. This test was developed and its analytical performance characteristics have been determined by [facility] [place]. It has not been cleared or approved by the FDA. This assay has been validated pursuant to the CLIA regulations and is used for clinical purposes. Test [facility], [address] [place] [id number] Joel Sams M.D., Ph.D., Director of Laboratories [phone], [place] [id number]       PLACIDO CHAN                    4    Cytopathology Addendum Report     As of: 09/02/20 12:50 EDT  Verified by: Venesas Jaime MD (Electronic Signature) Reported on: 09/02/20 14:17 EDT, [facility] [place] [address] Phone: [phone]   Fax: [phone] _________________________________________________________________   Radiology 12/2023  EXAM: [id number] - US THYROID ONLY  - ORDERED BY: DORITA ALVA   PROCEDURE DATE:  12/30/2023    INTERPRETATION:  CLINICAL INFORMATION: Follow-up thyroid nodule  COMPARISON: Ultrasound thyroid 1/14/2023  TECHNIQUE: Sonography of the thyroid.  FINDINGS: Right Lobe: 3.6 cm x 1.4 cm x 1.6 cm. 1.1 x 0.7 x 0.5 cm upper pole minimally complex cyst. Previously measured 0.9 x 0.8 x 0.5 cm complex cyst. TI-RAD 2  Left Lobe: 2.5 cm x 0.9 cm x 2.1 cm. 0.3 x 0.3 x 0.2 cm hypoechoic nodule previously not seen. TI-RAD 3.  Isthmus: 10 mm.  Cervical Lymph Nodes: No enlarged or abnormal morphology cervical nodes.  IMPRESSION:  Thyroid nodules as above.  __________________________________ ACR Thyroid Imaging, Reporting and Data System (TI-RADS): White Paper of the ACR TI-RADS Committee. J Am Marya Radiol 2017;14:587-595.  TI-RAD 1: Benign (No FNA) TI-RAD 2: Not suspicious (No FNA) TI-RAD 3: Mildly suspicious (FNA if > 2.5 cm, Follow if >1.5 cm) TI-RAD 4: Moderately suspicious (FNA if > 1.5 cm, Follow if > 1 cm) TI-RAD 5: Highly suspicious (FNA if > 1 cm, Follow if > 0.5 cm)    --- End of Report ---   [FreeTextEntry4] : Pt with h/o morbid obesity  thyroid nodule increasing in size  to 1.1 cm . & gene sequence  done via  [facility] showed BRAF V600 mutation ( unable to determine  V600E vs V600K)  By cytopathology and genetics this lesion is  suspicious for developing into a  papillary thyroid cancer.  BRAF mutations can be associated with more aggressive tumors.  Pt is under active surveillance with Dr Alva as pt with multiple comorbidities.  For GGN3FTx  the contraindication is Medullary thyroid cancer in patient or family ,  would check serum calcitonin level for patient.  Serum Calcitonin levels are elevated in patients with medullary thyroid cancer  In addition, want to ensure no h/o pancreatitis  As alternative, Metformin   to help with weight loss.  It does not cause as rapid a weight loss but can be helpful   can discuss with pt if he woudl like an in person endocrine evaluation  [FreeTextEntry5] : If there are any further questions, please reach out to me

## 2024-08-28 ENCOUNTER — NON-APPOINTMENT (OUTPATIENT)
Age: 54
End: 2024-08-28

## 2024-08-28 ENCOUNTER — APPOINTMENT (OUTPATIENT)
Dept: OTOLARYNGOLOGY | Facility: CLINIC | Age: 54
End: 2024-08-28
Payer: COMMERCIAL

## 2024-08-28 VITALS — SYSTOLIC BLOOD PRESSURE: 114 MMHG | DIASTOLIC BLOOD PRESSURE: 83 MMHG | TEMPERATURE: 98 F | HEART RATE: 67 BPM

## 2024-08-28 DIAGNOSIS — R22.0 LOCALIZED SWELLING, MASS AND LUMP, HEAD: ICD-10-CM

## 2024-08-28 PROCEDURE — 99203 OFFICE O/P NEW LOW 30 MIN: CPT | Mod: 25

## 2024-08-28 PROCEDURE — 31575 DIAGNOSTIC LARYNGOSCOPY: CPT

## 2024-08-28 NOTE — CONSULT LETTER
[Dear  ___] : Dear  [unfilled], [Consult Letter:] : I had the pleasure of evaluating your patient, [unfilled]. [Please see my note below.] : Please see my note below. [Consult Closing:] : Thank you very much for allowing me to participate in the care of this patient.  If you have any questions, please do not hesitate to contact me. [FreeTextEntry3] : Sincerely,   Emi Pendleton MD Otolaryngology- Facial Plastics  [address] [place] [id number] (P) - [phone] (F) - [phone]

## 2024-08-28 NOTE — PROCEDURE
[de-identified] : reason for laryngoscopy: unable to cooperate with mirror   Fiberoptic laryngoscopy was performed on the patient.  R/b/a was explained to the patient and they agreed to proceed with procedure.  Nasal cavities were treated with lidocaine and afrin prior to laryngoscopy for comfort.  Nasal cavities: clear b/l, Nasopharynx: mild erythema and swelling, Oropharynx/Hypopharynx: + lingual tonsillar hypertrophy no masses or lesions, posterior pharyngeal wall with cobblestoning.  No BOT hypertrophy, vallecula is clear of any masses or cysts, Supraglottis: epiglottis sharp with normal bilateral arytenoids, aryepiglottic folds, ventricles but with + interarytenoid edema consistent with reflux, Glottis: clear, TVCs mobile b/l.  Subglottis clear  No pooling of secretions in the pyriform sinus.  Airway patent  Scope #:21

## 2024-08-28 NOTE — PHYSICAL EXAM
[Normal] : lingual tonsils are normal [Midline] : trachea located in midline position [de-identified] : small papilloma < 1 cm at tip of the uvula

## 2024-08-28 NOTE — HISTORY OF PRESENT ILLNESS
[de-identified] : growth on the uvula noted at annual exam by dr. Virk in June  no pain in throat Denies dysphagia, dyspnea or dysphonia  + 1 ppd  x 10 years

## 2024-08-28 NOTE — ASSESSMENT
[FreeTextEntry1] : papilloma of uvula:  - small papilloma, not concerning for malignancy  - will monitor given smoking history   - f/up 1 month, mobile photo taken today  - reflux seen on scope, may also be contributing. Pt attempting weight loss

## 2024-09-18 ENCOUNTER — APPOINTMENT (OUTPATIENT)
Dept: OTOLARYNGOLOGY | Facility: CLINIC | Age: 54
End: 2024-09-18
Payer: COMMERCIAL

## 2024-09-18 DIAGNOSIS — R22.0 LOCALIZED SWELLING, MASS AND LUMP, HEAD: ICD-10-CM

## 2024-09-18 PROCEDURE — 99213 OFFICE O/P EST LOW 20 MIN: CPT

## 2024-09-18 NOTE — ASSESSMENT
[FreeTextEntry1] : papilloma of uvula:  - small papilloma, not concerning for malignancy - mobile photo taken today.   - no growth, not concerning  I think it may even look smaller than last visit.   - f/u 6 months or prn

## 2024-09-18 NOTE — PHYSICAL EXAM
[Normal] : lingual tonsils are normal [Midline] : trachea located in midline position [de-identified] : small papilloma < 1 cm at tip of the uvula  mobile photos taken today

## 2024-09-18 NOTE — END OF VISIT
Please create letter with Dr. Trisha Arroyo recommendations. [FreeTextEntry3] : I personally saw and examined PLACIDO CHAN  in detail. I spoke to KATHI Pike regarding the assessment and plan of care. I performed the procedures and relevant physical exam. I have made changes to the body of the note wherever necessary and appropriate.

## 2024-09-18 NOTE — HISTORY OF PRESENT ILLNESS
[de-identified] : growth on the uvula noted at annual exam by dr. Virk in June  no pain in throat Denies dysphagia, dyspnea or dysphonia  + 1 ppd  x 10 years  last visit noted to have small papilloma on tip of uvula, here for f/up to ensure no growth  [FreeTextEntry1] : no changes since last visit

## 2024-09-23 ENCOUNTER — APPOINTMENT (OUTPATIENT)
Dept: BARIATRICS/WEIGHT MGMT | Facility: CLINIC | Age: 54
End: 2024-09-23

## 2024-10-05 ENCOUNTER — OUTPATIENT (OUTPATIENT)
Dept: OUTPATIENT SERVICES | Facility: HOSPITAL | Age: 54
LOS: 1 days | End: 2024-10-05
Payer: COMMERCIAL

## 2024-10-05 ENCOUNTER — APPOINTMENT (OUTPATIENT)
Dept: CT IMAGING | Facility: IMAGING CENTER | Age: 54
End: 2024-10-05

## 2024-10-05 DIAGNOSIS — Z00.8 ENCOUNTER FOR OTHER GENERAL EXAMINATION: ICD-10-CM

## 2024-10-05 DIAGNOSIS — K08.499 PARTIAL LOSS OF TEETH DUE TO OTHER SPECIFIED CAUSE, UNSPECIFIED CLASS: Chronic | ICD-10-CM

## 2024-10-05 DIAGNOSIS — I77.810 THORACIC AORTIC ECTASIA: ICD-10-CM

## 2024-10-05 PROCEDURE — 71275 CT ANGIOGRAPHY CHEST: CPT | Mod: 26

## 2024-10-05 PROCEDURE — 71275 CT ANGIOGRAPHY CHEST: CPT

## 2024-10-16 ENCOUNTER — APPOINTMENT (OUTPATIENT)
Dept: CARDIOTHORACIC SURGERY | Facility: CLINIC | Age: 54
End: 2024-10-16
Payer: COMMERCIAL

## 2024-10-16 VITALS
SYSTOLIC BLOOD PRESSURE: 119 MMHG | OXYGEN SATURATION: 98 % | TEMPERATURE: 98 F | BODY MASS INDEX: 40.43 KG/M2 | WEIGHT: 315 LBS | DIASTOLIC BLOOD PRESSURE: 56 MMHG | HEIGHT: 74 IN | RESPIRATION RATE: 16 BRPM | HEART RATE: 57 BPM

## 2024-10-16 DIAGNOSIS — I77.810 THORACIC AORTIC ECTASIA: ICD-10-CM

## 2024-10-16 PROCEDURE — 99214 OFFICE O/P EST MOD 30 MIN: CPT

## 2024-12-09 ENCOUNTER — RX RENEWAL (OUTPATIENT)
Age: 54
End: 2024-12-09

## 2025-01-07 RX ORDER — APIXABAN 5 MG/1
5 TABLET, FILM COATED ORAL
Qty: 180 | Refills: 3 | Status: ACTIVE | COMMUNITY
Start: 2025-01-07 | End: 1900-01-01

## 2025-01-11 ENCOUNTER — APPOINTMENT (OUTPATIENT)
Dept: ULTRASOUND IMAGING | Facility: CLINIC | Age: 55
End: 2025-01-11

## 2025-01-11 ENCOUNTER — OUTPATIENT (OUTPATIENT)
Dept: OUTPATIENT SERVICES | Facility: HOSPITAL | Age: 55
LOS: 1 days | End: 2025-01-11
Payer: COMMERCIAL

## 2025-01-11 DIAGNOSIS — E04.1 NONTOXIC SINGLE THYROID NODULE: ICD-10-CM

## 2025-01-11 DIAGNOSIS — K08.499 PARTIAL LOSS OF TEETH DUE TO OTHER SPECIFIED CAUSE, UNSPECIFIED CLASS: Chronic | ICD-10-CM

## 2025-01-11 DIAGNOSIS — Z00.8 ENCOUNTER FOR OTHER GENERAL EXAMINATION: ICD-10-CM

## 2025-01-11 PROCEDURE — 76536 US EXAM OF HEAD AND NECK: CPT | Mod: 26

## 2025-01-11 PROCEDURE — 76536 US EXAM OF HEAD AND NECK: CPT

## 2025-02-11 ENCOUNTER — APPOINTMENT (OUTPATIENT)
Dept: SURGERY | Facility: CLINIC | Age: 55
End: 2025-02-11
Payer: COMMERCIAL

## 2025-02-11 DIAGNOSIS — E04.1 NONTOXIC SINGLE THYROID NODULE: ICD-10-CM

## 2025-02-11 PROCEDURE — 99214 OFFICE O/P EST MOD 30 MIN: CPT

## 2025-03-12 ENCOUNTER — APPOINTMENT (OUTPATIENT)
Dept: INTERNAL MEDICINE | Facility: CLINIC | Age: 55
End: 2025-03-12

## 2025-03-12 ENCOUNTER — OUTPATIENT (OUTPATIENT)
Dept: OUTPATIENT SERVICES | Facility: HOSPITAL | Age: 55
LOS: 1 days | End: 2025-03-12
Payer: COMMERCIAL

## 2025-03-12 ENCOUNTER — LABORATORY RESULT (OUTPATIENT)
Age: 55
End: 2025-03-12

## 2025-03-12 VITALS
HEART RATE: 62 BPM | BODY MASS INDEX: 40.43 KG/M2 | DIASTOLIC BLOOD PRESSURE: 72 MMHG | WEIGHT: 315 LBS | HEIGHT: 74 IN | OXYGEN SATURATION: 97 % | SYSTOLIC BLOOD PRESSURE: 132 MMHG

## 2025-03-12 DIAGNOSIS — K08.499 PARTIAL LOSS OF TEETH DUE TO OTHER SPECIFIED CAUSE, UNSPECIFIED CLASS: Chronic | ICD-10-CM

## 2025-03-12 DIAGNOSIS — Z00.00 ENCOUNTER FOR GENERAL ADULT MEDICAL EXAMINATION W/OUT ABNORMAL FINDINGS: ICD-10-CM

## 2025-03-12 DIAGNOSIS — E66.9 OBESITY, UNSPECIFIED: ICD-10-CM

## 2025-03-12 DIAGNOSIS — E66.01 MORBID (SEVERE) OBESITY DUE TO EXCESS CALORIES: ICD-10-CM

## 2025-03-12 DIAGNOSIS — G47.33 OBSTRUCTIVE SLEEP APNEA (ADULT) (PEDIATRIC): ICD-10-CM

## 2025-03-12 DIAGNOSIS — I10 ESSENTIAL (PRIMARY) HYPERTENSION: ICD-10-CM

## 2025-03-12 LAB
BASOPHILS # BLD AUTO: 0.09 K/UL
BASOPHILS NFR BLD AUTO: 1 %
EOSINOPHIL # BLD AUTO: 0.2 K/UL
EOSINOPHIL NFR BLD AUTO: 2.2 %
HCT VFR BLD CALC: 51.3 %
HGB BLD-MCNC: 16.7 G/DL
IMM GRANULOCYTES NFR BLD AUTO: 0.7 %
LYMPHOCYTES # BLD AUTO: 2.86 K/UL
LYMPHOCYTES NFR BLD AUTO: 31.6 %
MAN DIFF?: NORMAL
MCHC RBC-ENTMCNC: 30.4 PG
MCHC RBC-ENTMCNC: 32.6 G/DL
MCV RBC AUTO: 93.3 FL
MONOCYTES # BLD AUTO: 0.67 K/UL
MONOCYTES NFR BLD AUTO: 7.4 %
NEUTROPHILS # BLD AUTO: 5.17 K/UL
NEUTROPHILS NFR BLD AUTO: 57.1 %
PLATELET # BLD AUTO: 223 K/UL
RBC # BLD: 5.5 M/UL
RBC # FLD: 13 %
WBC # FLD AUTO: 9.05 K/UL

## 2025-03-12 PROCEDURE — 99214 OFFICE O/P EST MOD 30 MIN: CPT

## 2025-03-12 PROCEDURE — G0463: CPT

## 2025-03-12 PROCEDURE — G2211 COMPLEX E/M VISIT ADD ON: CPT | Mod: NC

## 2025-03-27 PROBLEM — E66.01 OBESITY, CLASS III, BMI 40-49.9 (MORBID OBESITY): Status: ACTIVE | Noted: 2025-03-27

## 2025-04-22 ENCOUNTER — APPOINTMENT (OUTPATIENT)
Dept: CARDIOLOGY | Facility: CLINIC | Age: 55
End: 2025-04-22

## 2025-05-17 ENCOUNTER — APPOINTMENT (OUTPATIENT)
Dept: CT IMAGING | Facility: IMAGING CENTER | Age: 55
End: 2025-05-17

## 2025-05-17 ENCOUNTER — OUTPATIENT (OUTPATIENT)
Dept: OUTPATIENT SERVICES | Facility: HOSPITAL | Age: 55
LOS: 1 days | End: 2025-05-17
Payer: COMMERCIAL

## 2025-05-17 DIAGNOSIS — Z00.8 ENCOUNTER FOR OTHER GENERAL EXAMINATION: ICD-10-CM

## 2025-05-17 DIAGNOSIS — K08.499 PARTIAL LOSS OF TEETH DUE TO OTHER SPECIFIED CAUSE, UNSPECIFIED CLASS: Chronic | ICD-10-CM

## 2025-05-17 DIAGNOSIS — I77.810 THORACIC AORTIC ECTASIA: ICD-10-CM

## 2025-05-17 PROCEDURE — 71275 CT ANGIOGRAPHY CHEST: CPT

## 2025-05-17 PROCEDURE — 71275 CT ANGIOGRAPHY CHEST: CPT | Mod: 26

## 2025-05-19 ENCOUNTER — TRANSCRIPTION ENCOUNTER (OUTPATIENT)
Age: 55
End: 2025-05-19

## 2025-05-22 ENCOUNTER — APPOINTMENT (OUTPATIENT)
Dept: CARDIOLOGY | Facility: CLINIC | Age: 55
End: 2025-05-22
Payer: COMMERCIAL

## 2025-05-22 PROCEDURE — 93306 TTE W/DOPPLER COMPLETE: CPT

## 2025-05-23 ENCOUNTER — APPOINTMENT (OUTPATIENT)
Dept: INTERNAL MEDICINE | Facility: CLINIC | Age: 55
End: 2025-05-23

## 2025-05-23 ENCOUNTER — OUTPATIENT (OUTPATIENT)
Dept: OUTPATIENT SERVICES | Facility: HOSPITAL | Age: 55
LOS: 1 days | End: 2025-05-23
Payer: COMMERCIAL

## 2025-05-23 VITALS
SYSTOLIC BLOOD PRESSURE: 124 MMHG | HEART RATE: 85 BPM | DIASTOLIC BLOOD PRESSURE: 72 MMHG | BODY MASS INDEX: 40.43 KG/M2 | WEIGHT: 315 LBS | OXYGEN SATURATION: 98 % | HEIGHT: 74 IN

## 2025-05-23 DIAGNOSIS — E66.01 MORBID (SEVERE) OBESITY DUE TO EXCESS CALORIES: ICD-10-CM

## 2025-05-23 DIAGNOSIS — K08.499 PARTIAL LOSS OF TEETH DUE TO OTHER SPECIFIED CAUSE, UNSPECIFIED CLASS: Chronic | ICD-10-CM

## 2025-05-23 DIAGNOSIS — I10 ESSENTIAL (PRIMARY) HYPERTENSION: ICD-10-CM

## 2025-05-23 PROCEDURE — G2211 COMPLEX E/M VISIT ADD ON: CPT | Mod: NC

## 2025-05-23 PROCEDURE — G0463: CPT

## 2025-05-23 PROCEDURE — 99213 OFFICE O/P EST LOW 20 MIN: CPT

## 2025-05-23 RX ORDER — TIRZEPATIDE 5 MG/.5ML
5 INJECTION, SOLUTION SUBCUTANEOUS
Qty: 4 | Refills: 3 | Status: ACTIVE | COMMUNITY
Start: 2025-05-23 | End: 1900-01-01

## 2025-05-30 RX ORDER — SEMAGLUTIDE 0.5 MG/.5ML
0.5 INJECTION, SOLUTION SUBCUTANEOUS
Qty: 1 | Refills: 3 | Status: ACTIVE | COMMUNITY
Start: 2025-05-30 | End: 1900-01-01

## 2025-06-11 ENCOUNTER — APPOINTMENT (OUTPATIENT)
Dept: CARDIOTHORACIC SURGERY | Facility: CLINIC | Age: 55
End: 2025-06-11
Payer: COMMERCIAL

## 2025-06-11 VITALS
BODY MASS INDEX: 40.43 KG/M2 | TEMPERATURE: 98 F | DIASTOLIC BLOOD PRESSURE: 81 MMHG | HEART RATE: 64 BPM | HEIGHT: 74 IN | RESPIRATION RATE: 16 BRPM | OXYGEN SATURATION: 98 % | SYSTOLIC BLOOD PRESSURE: 117 MMHG | WEIGHT: 315 LBS

## 2025-06-11 PROCEDURE — 99214 OFFICE O/P EST MOD 30 MIN: CPT

## 2025-06-26 ENCOUNTER — RX RENEWAL (OUTPATIENT)
Age: 55
End: 2025-06-26

## 2025-08-18 ENCOUNTER — OUTPATIENT (OUTPATIENT)
Dept: OUTPATIENT SERVICES | Facility: HOSPITAL | Age: 55
LOS: 1 days | End: 2025-08-18
Payer: COMMERCIAL

## 2025-08-18 ENCOUNTER — APPOINTMENT (OUTPATIENT)
Dept: INTERNAL MEDICINE | Facility: CLINIC | Age: 55
End: 2025-08-18
Payer: COMMERCIAL

## 2025-08-18 VITALS
BODY MASS INDEX: 39.27 KG/M2 | DIASTOLIC BLOOD PRESSURE: 78 MMHG | SYSTOLIC BLOOD PRESSURE: 116 MMHG | HEART RATE: 61 BPM | WEIGHT: 306 LBS | OXYGEN SATURATION: 98 % | HEIGHT: 74 IN

## 2025-08-18 DIAGNOSIS — K08.499 PARTIAL LOSS OF TEETH DUE TO OTHER SPECIFIED CAUSE, UNSPECIFIED CLASS: Chronic | ICD-10-CM

## 2025-08-18 DIAGNOSIS — G47.33 OBSTRUCTIVE SLEEP APNEA (ADULT) (PEDIATRIC): ICD-10-CM

## 2025-08-18 DIAGNOSIS — I10 ESSENTIAL (PRIMARY) HYPERTENSION: ICD-10-CM

## 2025-08-18 DIAGNOSIS — Z12.11 ENCOUNTER FOR SCREENING FOR MALIGNANT NEOPLASM OF COLON: ICD-10-CM

## 2025-08-18 DIAGNOSIS — E66.813 OBESITY, CLASS 3: ICD-10-CM

## 2025-08-18 DIAGNOSIS — E03.8 OTHER SPECIFIED HYPOTHYROIDISM: ICD-10-CM

## 2025-08-18 PROCEDURE — 99214 OFFICE O/P EST MOD 30 MIN: CPT

## 2025-08-18 PROCEDURE — G2211 COMPLEX E/M VISIT ADD ON: CPT | Mod: NC

## 2025-08-18 PROCEDURE — G0463: CPT

## 2025-08-19 DIAGNOSIS — I10 ESSENTIAL (PRIMARY) HYPERTENSION: ICD-10-CM

## 2025-08-22 ENCOUNTER — TRANSCRIPTION ENCOUNTER (OUTPATIENT)
Age: 55
End: 2025-08-22

## 2025-08-22 LAB
ALBUMIN SERPL ELPH-MCNC: 4.4 G/DL
ALP BLD-CCNC: 83 U/L
ALT SERPL-CCNC: 19 U/L
ANION GAP SERPL CALC-SCNC: 10 MMOL/L
AST SERPL-CCNC: 18 U/L
BASOPHILS # BLD AUTO: 0.11 K/UL
BASOPHILS NFR BLD AUTO: 1 %
BILIRUB SERPL-MCNC: 0.3 MG/DL
BUN SERPL-MCNC: 12 MG/DL
CALCIUM SERPL-MCNC: 9.7 MG/DL
CHLORIDE SERPL-SCNC: 108 MMOL/L
CHOLEST SERPL-MCNC: 144 MG/DL
CO2 SERPL-SCNC: 22 MMOL/L
CREAT SERPL-MCNC: 1.24 MG/DL
EGFRCR SERPLBLD CKD-EPI 2021: 70 ML/MIN/1.73M2
EOSINOPHIL # BLD AUTO: 0.28 K/UL
EOSINOPHIL NFR BLD AUTO: 2.6 %
GLUCOSE SERPL-MCNC: 92 MG/DL
HCT VFR BLD CALC: 47.6 %
HDLC SERPL-MCNC: 32 MG/DL
HGB BLD-MCNC: 16 G/DL
IMM GRANULOCYTES NFR BLD AUTO: 0.7 %
LDLC SERPL-MCNC: 84 MG/DL
LYMPHOCYTES # BLD AUTO: 2.68 K/UL
LYMPHOCYTES NFR BLD AUTO: 24.7 %
MAN DIFF?: NORMAL
MCHC RBC-ENTMCNC: 31.1 PG
MCHC RBC-ENTMCNC: 33.6 G/DL
MCV RBC AUTO: 92.4 FL
MONOCYTES # BLD AUTO: 0.66 K/UL
MONOCYTES NFR BLD AUTO: 6.1 %
NEUTROPHILS # BLD AUTO: 7.03 K/UL
NEUTROPHILS NFR BLD AUTO: 64.9 %
NONHDLC SERPL-MCNC: 112 MG/DL
PLATELET # BLD AUTO: 212 K/UL
POTASSIUM SERPL-SCNC: 4.3 MMOL/L
PROT SERPL-MCNC: 6.6 G/DL
RBC # BLD: 5.15 M/UL
RBC # FLD: 12.9 %
SODIUM SERPL-SCNC: 141 MMOL/L
T4 FREE SERPL-MCNC: 1.3 NG/DL
TRIGL SERPL-MCNC: 162 MG/DL
TSH SERPL-ACNC: 1.4 UIU/ML
WBC # FLD AUTO: 10.84 K/UL

## 2025-08-25 DIAGNOSIS — G47.33 OBSTRUCTIVE SLEEP APNEA (ADULT) (PEDIATRIC): ICD-10-CM

## 2025-08-25 DIAGNOSIS — Z12.11 ENCOUNTER FOR SCREENING FOR MALIGNANT NEOPLASM OF COLON: ICD-10-CM

## 2025-08-25 DIAGNOSIS — E03.8 OTHER SPECIFIED HYPOTHYROIDISM: ICD-10-CM

## 2025-08-25 DIAGNOSIS — E66.813 OBESITY, CLASS 3: ICD-10-CM

## 2025-09-19 ENCOUNTER — APPOINTMENT (OUTPATIENT)
Dept: CARDIOLOGY | Facility: CLINIC | Age: 55
End: 2025-09-19
Payer: COMMERCIAL

## 2025-09-19 VITALS
BODY MASS INDEX: 37.23 KG/M2 | WEIGHT: 290 LBS | OXYGEN SATURATION: 96 % | SYSTOLIC BLOOD PRESSURE: 132 MMHG | HEART RATE: 65 BPM | DIASTOLIC BLOOD PRESSURE: 86 MMHG

## 2025-09-19 DIAGNOSIS — I26.99 OTHER PULMONARY EMBOLISM W/OUT ACUTE COR PULMONALE: ICD-10-CM

## 2025-09-19 DIAGNOSIS — I10 ESSENTIAL (PRIMARY) HYPERTENSION: ICD-10-CM

## 2025-09-19 DIAGNOSIS — I77.810 THORACIC AORTIC ECTASIA: ICD-10-CM

## 2025-09-19 PROCEDURE — G2211 COMPLEX E/M VISIT ADD ON: CPT | Mod: NC

## 2025-09-19 PROCEDURE — 99215 OFFICE O/P EST HI 40 MIN: CPT

## 2025-09-19 PROCEDURE — 93000 ELECTROCARDIOGRAM COMPLETE: CPT

## 2025-09-19 RX ORDER — METOPROLOL SUCCINATE 25 MG/1
25 TABLET, EXTENDED RELEASE ORAL DAILY
Qty: 45 | Refills: 1 | Status: ACTIVE | COMMUNITY
Start: 2025-09-19 | End: 1900-01-01